# Patient Record
Sex: MALE | Race: WHITE | NOT HISPANIC OR LATINO
[De-identification: names, ages, dates, MRNs, and addresses within clinical notes are randomized per-mention and may not be internally consistent; named-entity substitution may affect disease eponyms.]

---

## 2017-10-10 ENCOUNTER — APPOINTMENT (OUTPATIENT)
Dept: INTERNAL MEDICINE | Facility: CLINIC | Age: 62
End: 2017-10-10
Payer: COMMERCIAL

## 2017-10-10 VITALS
OXYGEN SATURATION: 99 % | SYSTOLIC BLOOD PRESSURE: 130 MMHG | HEIGHT: 68 IN | HEART RATE: 61 BPM | WEIGHT: 164 LBS | BODY MASS INDEX: 24.86 KG/M2 | DIASTOLIC BLOOD PRESSURE: 80 MMHG | TEMPERATURE: 99 F

## 2017-10-10 DIAGNOSIS — M23.209 DERANGEMENT OF UNSPECIFIED MENISCUS DUE TO OLD TEAR OR INJURY, UNSPECIFIED KNEE: ICD-10-CM

## 2017-10-10 DIAGNOSIS — M75.102 UNSPECIFIED ROTATOR CUFF TEAR OR RUPTURE OF LEFT SHOULDER, NOT SPECIFIED AS TRAUMATIC: ICD-10-CM

## 2017-10-10 DIAGNOSIS — Z80.6 FAMILY HISTORY OF LEUKEMIA: ICD-10-CM

## 2017-10-10 DIAGNOSIS — Z82.49 FAMILY HISTORY OF ISCHEMIC HEART DISEASE AND OTHER DISEASES OF THE CIRCULATORY SYSTEM: ICD-10-CM

## 2017-10-10 DIAGNOSIS — Z86.69 PERSONAL HISTORY OF OTHER DISEASES OF THE NERVOUS SYSTEM AND SENSE ORGANS: ICD-10-CM

## 2017-10-10 DIAGNOSIS — Z23 ENCOUNTER FOR IMMUNIZATION: ICD-10-CM

## 2017-10-10 DIAGNOSIS — Z80.8 FAMILY HISTORY OF MALIGNANT NEOPLASM OF OTHER ORGANS OR SYSTEMS: ICD-10-CM

## 2017-10-10 PROCEDURE — 99386 PREV VISIT NEW AGE 40-64: CPT | Mod: 25

## 2017-10-10 PROCEDURE — G0008: CPT

## 2017-10-10 PROCEDURE — 90686 IIV4 VACC NO PRSV 0.5 ML IM: CPT

## 2017-10-10 PROCEDURE — 36415 COLL VENOUS BLD VENIPUNCTURE: CPT

## 2017-10-15 PROBLEM — Z82.49 FAMILY HISTORY OF CORONARY ARTERY DISEASE: Status: ACTIVE | Noted: 2017-10-15

## 2017-10-15 PROBLEM — Z80.6 FAMILY HISTORY OF LEUKEMIA: Status: ACTIVE | Noted: 2017-10-15

## 2017-10-15 PROBLEM — Z80.8 FAMILY HISTORY OF MALIGNANT MELANOMA: Status: ACTIVE | Noted: 2017-10-15

## 2017-10-15 PROBLEM — Z86.69 HISTORY OF MENIERE'S DISEASE: Status: RESOLVED | Noted: 2017-10-15 | Resolved: 2017-10-15

## 2017-10-16 ENCOUNTER — MEDICATION RENEWAL (OUTPATIENT)
Age: 62
End: 2017-10-16

## 2017-10-30 ENCOUNTER — TRANSCRIPTION ENCOUNTER (OUTPATIENT)
Age: 62
End: 2017-10-30

## 2017-11-03 LAB
ALBUMIN SERPL ELPH-MCNC: 4.4 G/DL
ALP BLD-CCNC: 42 U/L
ALT SERPL-CCNC: 20 U/L
ANION GAP SERPL CALC-SCNC: 11 MMOL/L
APPEARANCE: CLEAR
AST SERPL-CCNC: 19 U/L
BASOPHILS # BLD AUTO: 0.04 K/UL
BASOPHILS NFR BLD AUTO: 0.8 %
BILIRUB SERPL-MCNC: 0.3 MG/DL
BILIRUBIN URINE: NEGATIVE
BLOOD URINE: NEGATIVE
BUN SERPL-MCNC: 16 MG/DL
CALCIUM SERPL-MCNC: 9.9 MG/DL
CHLORIDE SERPL-SCNC: 103 MMOL/L
CHOLEST SERPL-MCNC: 273 MG/DL
CHOLEST/HDLC SERPL: 4 RATIO
CO2 SERPL-SCNC: 27 MMOL/L
COLOR: YELLOW
CREAT SERPL-MCNC: 0.78 MG/DL
EOSINOPHIL # BLD AUTO: 0.42 K/UL
EOSINOPHIL NFR BLD AUTO: 8.7 %
GLUCOSE QUALITATIVE U: NEGATIVE MG/DL
GLUCOSE SERPL-MCNC: 84 MG/DL
HBA1C MFR BLD HPLC: 5.6 %
HCT VFR BLD CALC: 40.9 %
HDLC SERPL-MCNC: 68 MG/DL
HGB BLD-MCNC: 13 G/DL
IMM GRANULOCYTES NFR BLD AUTO: 0.2 %
KETONES URINE: NEGATIVE
LDLC SERPL CALC-MCNC: 182 MG/DL
LEUKOCYTE ESTERASE URINE: NEGATIVE
LYMPHOCYTES # BLD AUTO: 1.86 K/UL
LYMPHOCYTES NFR BLD AUTO: 38.4 %
MAN DIFF?: NORMAL
MCHC RBC-ENTMCNC: 31.2 PG
MCHC RBC-ENTMCNC: 31.8 GM/DL
MCV RBC AUTO: 98.1 FL
MONOCYTES # BLD AUTO: 0.47 K/UL
MONOCYTES NFR BLD AUTO: 9.7 %
NEUTROPHILS # BLD AUTO: 2.05 K/UL
NEUTROPHILS NFR BLD AUTO: 42.2 %
NITRITE URINE: NEGATIVE
PH URINE: 6.5
PLATELET # BLD AUTO: 269 K/UL
POTASSIUM SERPL-SCNC: 4.5 MMOL/L
PROT SERPL-MCNC: 7.2 G/DL
PROTEIN URINE: NEGATIVE MG/DL
PSA SERPL-MCNC: 2.06 NG/ML
RBC # BLD: 4.17 M/UL
RBC # FLD: 15.6 %
SODIUM SERPL-SCNC: 141 MMOL/L
SPECIFIC GRAVITY URINE: 1.01
T4 FREE SERPL-MCNC: 1.2 NG/DL
TRIGL SERPL-MCNC: 115 MG/DL
TSH SERPL-ACNC: 1.95 UIU/ML
UROBILINOGEN URINE: NEGATIVE MG/DL
WBC # FLD AUTO: 4.85 K/UL

## 2018-01-02 ENCOUNTER — MEDICATION RENEWAL (OUTPATIENT)
Age: 63
End: 2018-01-02

## 2018-01-12 ENCOUNTER — INPATIENT (INPATIENT)
Facility: HOSPITAL | Age: 63
LOS: 1 days | Discharge: HOME | DRG: 964 | End: 2018-01-14
Attending: SURGERY | Admitting: SURGERY
Payer: COMMERCIAL

## 2018-01-12 ENCOUNTER — EMERGENCY (EMERGENCY)
Facility: HOSPITAL | Age: 63
LOS: 1 days | Discharge: ANOTHER HOSPITAL | End: 2018-01-12
Attending: EMERGENCY MEDICINE | Admitting: EMERGENCY MEDICINE
Payer: COMMERCIAL

## 2018-01-12 VITALS
RESPIRATION RATE: 20 BRPM | HEIGHT: 68 IN | SYSTOLIC BLOOD PRESSURE: 151 MMHG | WEIGHT: 160.06 LBS | DIASTOLIC BLOOD PRESSURE: 76 MMHG | HEART RATE: 700 BPM | OXYGEN SATURATION: 99 %

## 2018-01-12 VITALS
HEART RATE: 84 BPM | SYSTOLIC BLOOD PRESSURE: 144 MMHG | TEMPERATURE: 99 F | OXYGEN SATURATION: 97 % | RESPIRATION RATE: 16 BRPM | DIASTOLIC BLOOD PRESSURE: 88 MMHG

## 2018-01-12 VITALS
HEART RATE: 88 BPM | DIASTOLIC BLOOD PRESSURE: 78 MMHG | SYSTOLIC BLOOD PRESSURE: 148 MMHG | RESPIRATION RATE: 18 BRPM | OXYGEN SATURATION: 100 %

## 2018-01-12 DIAGNOSIS — S22.42XA MULTIPLE FRACTURES OF RIBS, LEFT SIDE, INITIAL ENCOUNTER FOR CLOSED FRACTURE: ICD-10-CM

## 2018-01-12 LAB
ALBUMIN SERPL ELPH-MCNC: 3.7 G/DL — SIGNIFICANT CHANGE UP (ref 3.3–5)
ALP SERPL-CCNC: 47 U/L — SIGNIFICANT CHANGE UP (ref 40–120)
ALT FLD-CCNC: 34 U/L — SIGNIFICANT CHANGE UP (ref 12–78)
ANION GAP SERPL CALC-SCNC: 10 MMOL/L — SIGNIFICANT CHANGE UP (ref 5–17)
AST SERPL-CCNC: 35 U/L — SIGNIFICANT CHANGE UP (ref 15–37)
BASOPHILS # BLD AUTO: 0.1 K/UL — SIGNIFICANT CHANGE UP (ref 0–0.2)
BASOPHILS NFR BLD AUTO: 1.4 % — SIGNIFICANT CHANGE UP (ref 0–2)
BILIRUB SERPL-MCNC: 0.4 MG/DL — SIGNIFICANT CHANGE UP (ref 0.2–1.2)
BLD GP AB SCN SERPL QL: NEGATIVE — SIGNIFICANT CHANGE UP
BUN SERPL-MCNC: 13 MG/DL — SIGNIFICANT CHANGE UP (ref 7–23)
CALCIUM SERPL-MCNC: 8.4 MG/DL — LOW (ref 8.5–10.1)
CHLORIDE SERPL-SCNC: 105 MMOL/L — SIGNIFICANT CHANGE UP (ref 96–108)
CO2 SERPL-SCNC: 23 MMOL/L — SIGNIFICANT CHANGE UP (ref 22–31)
CREAT SERPL-MCNC: 0.86 MG/DL — SIGNIFICANT CHANGE UP (ref 0.5–1.3)
EOSINOPHIL # BLD AUTO: 0.3 K/UL — SIGNIFICANT CHANGE UP (ref 0–0.5)
EOSINOPHIL NFR BLD AUTO: 3.6 % — SIGNIFICANT CHANGE UP (ref 0–6)
GLUCOSE SERPL-MCNC: 114 MG/DL — HIGH (ref 70–99)
HCT VFR BLD CALC: 37.2 % — LOW (ref 39–50)
HGB BLD-MCNC: 12 G/DL — LOW (ref 13–17)
LYMPHOCYTES # BLD AUTO: 2.3 K/UL — SIGNIFICANT CHANGE UP (ref 1–3.3)
LYMPHOCYTES # BLD AUTO: 29.2 % — SIGNIFICANT CHANGE UP (ref 13–44)
MCHC RBC-ENTMCNC: 31.2 PG — SIGNIFICANT CHANGE UP (ref 27–34)
MCHC RBC-ENTMCNC: 32.3 GM/DL — SIGNIFICANT CHANGE UP (ref 32–36)
MCV RBC AUTO: 96.5 FL — SIGNIFICANT CHANGE UP (ref 80–100)
MONOCYTES # BLD AUTO: 0.6 K/UL — SIGNIFICANT CHANGE UP (ref 0–0.9)
MONOCYTES NFR BLD AUTO: 7.4 % — SIGNIFICANT CHANGE UP (ref 1–9)
NEUTROPHILS # BLD AUTO: 4.6 K/UL — SIGNIFICANT CHANGE UP (ref 1.8–7.4)
NEUTROPHILS NFR BLD AUTO: 58.4 % — SIGNIFICANT CHANGE UP (ref 43–77)
PLATELET # BLD AUTO: 214 K/UL — SIGNIFICANT CHANGE UP (ref 150–400)
POTASSIUM SERPL-MCNC: 3.4 MMOL/L — LOW (ref 3.5–5.3)
POTASSIUM SERPL-SCNC: 3.4 MMOL/L — LOW (ref 3.5–5.3)
PROT SERPL-MCNC: 7.1 G/DL — SIGNIFICANT CHANGE UP (ref 6–8.3)
RBC # BLD: 3.85 M/UL — LOW (ref 4.2–5.8)
RBC # FLD: 13.3 % — SIGNIFICANT CHANGE UP (ref 10.3–14.5)
RH IG SCN BLD-IMP: POSITIVE — SIGNIFICANT CHANGE UP
SODIUM SERPL-SCNC: 138 MMOL/L — SIGNIFICANT CHANGE UP (ref 135–145)
WBC # BLD: 7.9 K/UL — SIGNIFICANT CHANGE UP (ref 3.8–10.5)
WBC # FLD AUTO: 7.9 K/UL — SIGNIFICANT CHANGE UP (ref 3.8–10.5)

## 2018-01-12 PROCEDURE — 74177 CT ABD & PELVIS W/CONTRAST: CPT | Mod: 26

## 2018-01-12 PROCEDURE — 99285 EMERGENCY DEPT VISIT HI MDM: CPT

## 2018-01-12 PROCEDURE — 70450 CT HEAD/BRAIN W/O DYE: CPT | Mod: 26,77

## 2018-01-12 PROCEDURE — 74177 CT ABD & PELVIS W/CONTRAST: CPT

## 2018-01-12 PROCEDURE — 71260 CT THORAX DX C+: CPT

## 2018-01-12 PROCEDURE — 71045 X-RAY EXAM CHEST 1 VIEW: CPT | Mod: 26

## 2018-01-12 PROCEDURE — 36415 COLL VENOUS BLD VENIPUNCTURE: CPT

## 2018-01-12 PROCEDURE — 80053 COMPREHEN METABOLIC PANEL: CPT

## 2018-01-12 PROCEDURE — 96375 TX/PRO/DX INJ NEW DRUG ADDON: CPT

## 2018-01-12 PROCEDURE — 70450 CT HEAD/BRAIN W/O DYE: CPT

## 2018-01-12 PROCEDURE — 72125 CT NECK SPINE W/O DYE: CPT | Mod: 26

## 2018-01-12 PROCEDURE — 99285 EMERGENCY DEPT VISIT HI MDM: CPT | Mod: 25

## 2018-01-12 PROCEDURE — 85027 COMPLETE CBC AUTOMATED: CPT

## 2018-01-12 PROCEDURE — 99222 1ST HOSP IP/OBS MODERATE 55: CPT

## 2018-01-12 PROCEDURE — 72125 CT NECK SPINE W/O DYE: CPT

## 2018-01-12 PROCEDURE — 73030 X-RAY EXAM OF SHOULDER: CPT | Mod: 26,LT

## 2018-01-12 PROCEDURE — 71260 CT THORAX DX C+: CPT | Mod: 26

## 2018-01-12 PROCEDURE — 96374 THER/PROPH/DIAG INJ IV PUSH: CPT | Mod: XU

## 2018-01-12 PROCEDURE — 70450 CT HEAD/BRAIN W/O DYE: CPT | Mod: 26

## 2018-01-12 RX ORDER — LIDOCAINE 4 G/100G
1 CREAM TOPICAL ONCE
Qty: 0 | Refills: 0 | Status: COMPLETED | OUTPATIENT
Start: 2018-01-12 | End: 2018-01-12

## 2018-01-12 RX ORDER — HYDROCORTISONE 20 MG
200 TABLET ORAL ONCE
Qty: 0 | Refills: 0 | Status: COMPLETED | OUTPATIENT
Start: 2018-01-12 | End: 2018-01-12

## 2018-01-12 RX ORDER — DIPHENHYDRAMINE HCL 50 MG
50 CAPSULE ORAL ONCE
Qty: 0 | Refills: 0 | Status: COMPLETED | OUTPATIENT
Start: 2018-01-12 | End: 2018-01-12

## 2018-01-12 RX ORDER — ACETAMINOPHEN 500 MG
1000 TABLET ORAL ONCE
Qty: 0 | Refills: 0 | Status: COMPLETED | OUTPATIENT
Start: 2018-01-12 | End: 2018-01-12

## 2018-01-12 RX ORDER — MORPHINE SULFATE 50 MG/1
4 CAPSULE, EXTENDED RELEASE ORAL ONCE
Qty: 0 | Refills: 0 | Status: DISCONTINUED | OUTPATIENT
Start: 2018-01-12 | End: 2018-01-12

## 2018-01-12 RX ADMIN — LIDOCAINE 1 PATCH: 4 CREAM TOPICAL at 20:38

## 2018-01-12 RX ADMIN — Medication 400 MILLIGRAM(S): at 20:38

## 2018-01-12 RX ADMIN — Medication 1000 MILLIGRAM(S): at 22:06

## 2018-01-12 RX ADMIN — Medication 50 MILLIGRAM(S): at 17:13

## 2018-01-12 RX ADMIN — MORPHINE SULFATE 4 MILLIGRAM(S): 50 CAPSULE, EXTENDED RELEASE ORAL at 19:19

## 2018-01-12 RX ADMIN — Medication 200 MILLIGRAM(S): at 17:13

## 2018-01-12 RX ADMIN — MORPHINE SULFATE 4 MILLIGRAM(S): 50 CAPSULE, EXTENDED RELEASE ORAL at 19:13

## 2018-01-12 NOTE — ED PROVIDER NOTE - OBJECTIVE STATEMENT
61 y/o M w/ hx of hyperchol presents as tx from [facility] after he fell off a roof this morning, from 18 ft height, found to have multiple L. sided rib fx, lung contusion, small L.sided pneumo, and questionable very small L. temporal focus of bleed. No LOC, not on blood thinners. Pt mentating well, moving all extrem, no HA, no back pain, reports L. sided rib pain.

## 2018-01-12 NOTE — H&P ADULT - NSHPPHYSICALEXAM_GEN_ALL_CORE
Tmax: 37.1 (01-12-18 @ 20:23)  HR: 72  BP: 120/72  RR: 18  SpO2: 97%    Gen: NAD  Head: Small abrasion to L head  Neck: No c-spine tenderness  Chest: L sided chest wall tenderness  Lungs: Non-labored breathing  Heart: S1, S2  Abdomen: Soft, ND, NTP  : No blood around meatus  Extremities: Abrasion to L forearm  Vascular: Palpable radial and DP pulses  Back: No tenderness

## 2018-01-12 NOTE — H&P ADULT - ASSESSMENT
68 M presents after a fall from 2 story building. Found to have L 1, 3-8 rib fractures, small pneumothorax, and lung contusion. There was also a questionable attenuation of the L temporal region, that a small focus of hemorrhage cannot be excluded. Will admit to Trauma Surgery (Caitlin). Patient was pulling in 2 L on incentive spirometry and had an oxygen saturation of 98% on room air.  -Pain control (Tylenol, lidocaine patch, oxycodone PRN). No Motrin given age and questionable head bleed.  -Will repeat CT head to assess questionable head bleed  -Incentive spirometry  -Repeat CXR to assess small pneumothorax seen on CT chest. No need for chest tube at this time.  -Regular diet  -Mechanical VTE prophylaxis for now  -D/w attending  SHELBY Tucker  [id number] 68 M presents after a fall from 2 story building. Found to have L 1, 3-8 rib fractures, small pneumothorax, and lung contusion. There was also a questionable attenuation of the L temporal region, that a small focus of hemorrhage cannot be excluded. Will admit to Trauma Surgery (Caitlin). Patient was pulling in 2 L on incentive spirometry and had an oxygen saturation of 98% on room air.  -Pain control (Tylenol, lidocaine patch, oxycodone PRN). No Motrin given age and questionable head bleed.  -Will repeat CT head to assess questionable head bleed  -Incentive spirometry  -Repeat CXR to assess small pneumothorax seen on CT chest. No need for chest tube at this time.  -Regular diet  -Mechanical VTE prophylaxis for now  -Will need to obtain does of home medications  -D/w attending  SHELBY Tucker  8299 68 M presents after a fall from 2 story building. Found to have L 1, 3-8 rib fractures, small pneumothorax, and lung contusion. There was also a questionable attenuation of the L temporal region, that a small focus of hemorrhage cannot be excluded. Will admit to Trauma Surgery (Caitlin). Patient was pulling in 2 L on incentive spirometry and had an oxygen saturation of 98% on room air.  -Pain control (Tylenol, lidocaine patch, oxycodone PRN). No Motrin given age and questionable head bleed.  -Will repeat CT head to assess questionable head bleed  -Incentive spirometry  -Obtain follow up CXR to assess small pneumothorax seen on CT chest. No need for chest tube at this time.  -Regular diet  -Mechanical VTE prophylaxis for now  -Will need to obtain does of home medications  -D/w attending  SHELBY Tucker  6707 62 M presents after a fall from 2 story building. Found to have L 1, 3-8 rib fractures, small pneumothorax, and lung contusion. There was also a questionable attenuation of the L temporal region, that a small focus of hemorrhage cannot be excluded. Will admit to Trauma Surgery (Caitlin). Patient was pulling in 2 L on incentive spirometry and had an oxygen saturation of 98% on room air.  -Pain control (Tylenol, lidocaine patch, oxycodone PRN). No Motrin given age and questionable head bleed.  -Will repeat CT head to assess questionable head bleed  -Incentive spirometry  -Obtain follow up CXR to assess small pneumothorax seen on CT chest. No need for chest tube at this time.  -Regular diet  -Mechanical VTE prophylaxis for now  -Will need to obtain does of home medications  -D/w attending  SHELBY Tucker  [id number]

## 2018-01-12 NOTE — ED PROVIDER NOTE - OBJECTIVE STATEMENT
63 yo male hx of HLD, gastritis s/p mechanical fall from roof landed on daugherty of car 18 feet from roof.  No LOC, no neck pain, walked into ED c/o left sided rib pain and pain with taking a deep breath.  No nausea/vomiting.  On ASA 81mg.  Has hx of allergy to shellfish/iodine, but only hives, no anaphylaxis.  No other complaints.  Tetanus up to date (1 year ago at [facility]).

## 2018-01-12 NOTE — ED ADULT NURSE NOTE - ED STAT RN HANDOFF DETAILS 2
Report given to Transfer center MAGNO patel for continuum of care. @1916 pt left with EMS, pain controlled, pt aaox3 in no acute distress.

## 2018-01-12 NOTE — H&P ADULT - NSHPLABSRESULTS_GEN_ALL_CORE
01-12-18    WBC: 7.9  Hgb: 12.0  Hct: 37.2  Plt: 214    Na: 138  K: 3.4  Cl: 105  HCO3: 23  BUN: 13  Cr: 0.86  Glu: 114    Ca: 8.4    Protein: 7.1  Albumin: 3.7  Total bilirubin: 0.4  AST: 35  ALT: 34    CT head: Small focal area of increased attenuation left temporal region as discussed, may represent calcification, cannot exclude small focus of hemorrhage.  CT c-spine: Multilevel degenerative disc disease/cervical spondylosis. No acute cervical spine fracture demonstrated.  CT chest: There is a nondisplaced fracture the medial aspect of the left first rib. There is a minimally displaced fracture involving the anterolateral left third rib. There are segmental fractures involving the left fourth, fifth, sixth, seventh and eighth ribs, with fractures medially in the adjacent to the costovertebral margins, and more anteriorly laterally. There is a small anterior and left apical pneumothorax. No cardiomediastinal shift is noted. There is a small left-sided pleural effusion or hemothorax. Patchy left lower lobe consolidation may represent atelectasis or lung contusion.  CT abdomen/pelvis: No acute abdominal visceral organ injury noted.    L shoulder x-ray: Preliminary read shows no fracture

## 2018-01-12 NOTE — H&P ADULT - HISTORY OF PRESENT ILLNESS
62 M presents after a fall. Patient was on the roof of a 2 story house, cleaning the drains. He slipped and landed on his car below. Patient struck his L side, and did not lose consciousness. Patient drove himself to [place]. Was then transferred to [facility] for further management. In the ED, primary survey was intact. GCS was 15. Currently, patient complains of L sided chest wall pain. Denies shortness of breath.    Of note, patient's L forearm was scraped during the CT scan at [place].

## 2018-01-12 NOTE — ED ADULT NURSE NOTE - OBJECTIVE STATEMENT
62 y m came to the ed by ems from [facility] for a trauma evaluation. patient states around 4pm today he fell off a roof about 20ft and landed on a car. denies loc. has an abrasion on the left side of his head. c/o left sided rib pain. ems reports a possible brain bleed. reports multiple left rib fractures. patient came to the ed with a 20g PIV in right ac. has wound wrapped on left elbow which ems and patient reports was caused by the [facility] staff. patient is a/ox3. perrl. able to move all extremities. skin is warm and dry. abdomen is soft and nontender.

## 2018-01-12 NOTE — ED PROVIDER NOTE - CARE PLAN
Principal Discharge DX:	Fall, initial encounter Principal Discharge DX:	Multiple rib fractures involving four or more ribs  Secondary Diagnosis:	Head injury, acute, initial encounter Principal Discharge DX:	Multiple rib fractures involving four or more ribs  Secondary Diagnosis:	Head injury, acute, initial encounter  Secondary Diagnosis:	Pneumothorax on left

## 2018-01-12 NOTE — H&P ADULT - ATTENDING COMMENTS
Patient seen and examined and agree with above.   The patient ws transferred from [facility] after a fall from 18 feet roof.  GCS 15 and hemodynamically normal.  Injuries include:  1) Small focal left temporal focus- it is questionable TBI and repeat head demonstrates stable 4 mm hyperdense focus. Plan for neurosurgical consultation. The patient is currently neurologically intact.  2) multiple rib fractrues - nondisplaced fracture of medial 1st rib, min displaced fx of anteriorlateral left 3rd rib and left 4-8th segmental fracture as well as left apical pneumothorax  -repeat CXR does not appear to demonstrated pneumothorax. He is able to take 2 liters on incentive spirometer and 98% on room air. Patient was placed on supplemental oxygen due to small pneumothorax.  -aggressive pulm toileting and continue incentive spirometer  -multimodal pain control    Patient is to have regular diet and ambulate with PT.  I have discussed his care plan with him and his family

## 2018-01-12 NOTE — ED ADULT NURSE REASSESSMENT NOTE - NS ED NURSE REASSESS COMMENT FT1
patient is resting in the room waiting for repeat head ct results and dispo. denies any complaints. patient was educated and has been using the incentive spirometer. family is at the bedside. vss/nad. will continue to monitor.

## 2018-01-12 NOTE — ED PROVIDER NOTE - PHYSICAL EXAMINATION
+left parietal scalp abrasion, no laceration, +ttp left lateral rib, no crepitus, no ecchymosis, no deformity

## 2018-01-12 NOTE — ED PROVIDER NOTE - MEDICAL DECISION MAKING DETAILS
will Pan CT scan based on mechanism of fall, patient otherwise stable, will pretreat with benadryl and steroids prior to CT

## 2018-01-12 NOTE — ED PROVIDER NOTE - PHYSICAL EXAMINATION
Gen: NAD  Eyes:  sclerae white, no icterus  ENT: Moist mucous membranes. No exudates  Neck: supple, no LAD, mass or goiter, trachea midline  CV: RRR. Audible S1 and S2. No murmurs, rubs, gallops, S3, nor S4  Pulm: Clear to auscultation bilaterally. No wheezes, rales, or rhonchi  Abd: BS+, nondistended, No tenderness to palpation  Musculoskeletal:  No midline spinal TTP, + L. sided rib tenderness rib 4-8  Skin: no lesions or scars noted  Psych: mood good, affect full range and congruent with mood.  Neurologic: AAOx3

## 2018-01-12 NOTE — ED PROVIDER NOTE - CARE PLAN
Principal Discharge DX:	Closed fracture of multiple ribs of left side, initial encounter  Secondary Diagnosis:	Trauma  Secondary Diagnosis:	Brain lesion

## 2018-01-12 NOTE — ED PROVIDER NOTE - PROGRESS NOTE DETAILS
discussed case with Dr. Montoya? trauma surgeon, accepting to [place] for further evaluation, Dr. Valdes  aware will accept to [place] ED

## 2018-01-12 NOTE — ED ADULT NURSE NOTE - OBJECTIVE STATEMENT
Pt has a fall from 7 ft onto a car/ c/o left chest pain/ denies LOC/ no obvious deformities noted/ NAD noted

## 2018-01-12 NOTE — ED PROVIDER NOTE - ATTENDING CONTRIBUTION TO CARE
attending Lucio: 62yM h/o HLD transferred from [facility]SH after fall off roof at 4pm today, reports slipping off roof, falling approx 18 feet onto daugherty of car. Denies head trauma or LOC. Drove himself to hospital. No AC. CTs at OSH showed multiple L sided rib fx, small L PTX, possible lung contusion, and questionable very small L temporal lobe bleed. On exam, neuro intact, equal breath sounds bilaterally, on supplemental O2. Will administer pain control, repeat head CT at 4 hour dejah, trauma consult and admission.

## 2018-01-13 LAB
ANION GAP SERPL CALC-SCNC: 12 MMOL/L — SIGNIFICANT CHANGE UP (ref 5–17)
BUN SERPL-MCNC: 11 MG/DL — SIGNIFICANT CHANGE UP (ref 7–23)
CALCIUM SERPL-MCNC: 9 MG/DL — SIGNIFICANT CHANGE UP (ref 8.4–10.5)
CHLORIDE SERPL-SCNC: 102 MMOL/L — SIGNIFICANT CHANGE UP (ref 96–108)
CO2 SERPL-SCNC: 24 MMOL/L — SIGNIFICANT CHANGE UP (ref 22–31)
CREAT SERPL-MCNC: 0.75 MG/DL — SIGNIFICANT CHANGE UP (ref 0.5–1.3)
GLUCOSE SERPL-MCNC: 115 MG/DL — HIGH (ref 70–99)
HCT VFR BLD CALC: 34.2 % — LOW (ref 39–50)
HGB BLD-MCNC: 11.2 G/DL — LOW (ref 13–17)
MCHC RBC-ENTMCNC: 31.1 PG — SIGNIFICANT CHANGE UP (ref 27–34)
MCHC RBC-ENTMCNC: 32.7 GM/DL — SIGNIFICANT CHANGE UP (ref 32–36)
MCV RBC AUTO: 95 FL — SIGNIFICANT CHANGE UP (ref 80–100)
PLATELET # BLD AUTO: 198 K/UL — SIGNIFICANT CHANGE UP (ref 150–400)
POTASSIUM SERPL-MCNC: 4.1 MMOL/L — SIGNIFICANT CHANGE UP (ref 3.5–5.3)
POTASSIUM SERPL-SCNC: 4.1 MMOL/L — SIGNIFICANT CHANGE UP (ref 3.5–5.3)
RBC # BLD: 3.6 M/UL — LOW (ref 4.2–5.8)
RBC # FLD: 14.5 % — SIGNIFICANT CHANGE UP (ref 10.3–14.5)
SODIUM SERPL-SCNC: 138 MMOL/L — SIGNIFICANT CHANGE UP (ref 135–145)
WBC # BLD: 5.09 K/UL — SIGNIFICANT CHANGE UP (ref 3.8–10.5)
WBC # FLD AUTO: 5.09 K/UL — SIGNIFICANT CHANGE UP (ref 3.8–10.5)

## 2018-01-13 PROCEDURE — 71045 X-RAY EXAM CHEST 1 VIEW: CPT | Mod: 26

## 2018-01-13 RX ORDER — PANTOPRAZOLE SODIUM 20 MG/1
40 TABLET, DELAYED RELEASE ORAL DAILY
Qty: 0 | Refills: 0 | Status: DISCONTINUED | OUTPATIENT
Start: 2018-01-13 | End: 2018-01-14

## 2018-01-13 RX ORDER — LIDOCAINE 4 G/100G
1 CREAM TOPICAL DAILY
Qty: 0 | Refills: 0 | Status: DISCONTINUED | OUTPATIENT
Start: 2018-01-13 | End: 2018-01-14

## 2018-01-13 RX ORDER — OXYCODONE HYDROCHLORIDE 5 MG/1
10 TABLET ORAL EVERY 4 HOURS
Qty: 0 | Refills: 0 | Status: DISCONTINUED | OUTPATIENT
Start: 2018-01-13 | End: 2018-01-14

## 2018-01-13 RX ORDER — OXYCODONE HYDROCHLORIDE 5 MG/1
5 TABLET ORAL EVERY 4 HOURS
Qty: 0 | Refills: 0 | Status: DISCONTINUED | OUTPATIENT
Start: 2018-01-13 | End: 2018-01-14

## 2018-01-13 RX ORDER — ACETAMINOPHEN 500 MG
650 TABLET ORAL EVERY 6 HOURS
Qty: 0 | Refills: 0 | Status: DISCONTINUED | OUTPATIENT
Start: 2018-01-13 | End: 2018-01-14

## 2018-01-13 RX ADMIN — Medication 650 MILLIGRAM(S): at 08:00

## 2018-01-13 RX ADMIN — Medication 650 MILLIGRAM(S): at 23:23

## 2018-01-13 RX ADMIN — OXYCODONE HYDROCHLORIDE 10 MILLIGRAM(S): 5 TABLET ORAL at 18:37

## 2018-01-13 RX ADMIN — OXYCODONE HYDROCHLORIDE 10 MILLIGRAM(S): 5 TABLET ORAL at 23:22

## 2018-01-13 RX ADMIN — Medication 650 MILLIGRAM(S): at 23:22

## 2018-01-13 RX ADMIN — OXYCODONE HYDROCHLORIDE 5 MILLIGRAM(S): 5 TABLET ORAL at 00:47

## 2018-01-13 RX ADMIN — Medication 650 MILLIGRAM(S): at 12:30

## 2018-01-13 RX ADMIN — OXYCODONE HYDROCHLORIDE 10 MILLIGRAM(S): 5 TABLET ORAL at 18:19

## 2018-01-13 RX ADMIN — OXYCODONE HYDROCHLORIDE 10 MILLIGRAM(S): 5 TABLET ORAL at 12:30

## 2018-01-13 RX ADMIN — OXYCODONE HYDROCHLORIDE 5 MILLIGRAM(S): 5 TABLET ORAL at 06:04

## 2018-01-13 RX ADMIN — PANTOPRAZOLE SODIUM 40 MILLIGRAM(S): 20 TABLET, DELAYED RELEASE ORAL at 23:22

## 2018-01-13 RX ADMIN — OXYCODONE HYDROCHLORIDE 10 MILLIGRAM(S): 5 TABLET ORAL at 12:23

## 2018-01-13 RX ADMIN — Medication 650 MILLIGRAM(S): at 18:37

## 2018-01-13 RX ADMIN — Medication 650 MILLIGRAM(S): at 18:19

## 2018-01-13 RX ADMIN — OXYCODONE HYDROCHLORIDE 10 MILLIGRAM(S): 5 TABLET ORAL at 23:52

## 2018-01-13 RX ADMIN — Medication 650 MILLIGRAM(S): at 06:04

## 2018-01-13 RX ADMIN — Medication 650 MILLIGRAM(S): at 12:06

## 2018-01-13 RX ADMIN — OXYCODONE HYDROCHLORIDE 5 MILLIGRAM(S): 5 TABLET ORAL at 08:00

## 2018-01-13 RX ADMIN — LIDOCAINE 1 PATCH: 4 CREAM TOPICAL at 12:04

## 2018-01-13 RX ADMIN — LIDOCAINE 1 PATCH: 4 CREAM TOPICAL at 12:06

## 2018-01-14 ENCOUNTER — TRANSCRIPTION ENCOUNTER (OUTPATIENT)
Age: 63
End: 2018-01-14

## 2018-01-14 VITALS — DIASTOLIC BLOOD PRESSURE: 77 MMHG | OXYGEN SATURATION: 97 % | HEART RATE: 65 BPM | SYSTOLIC BLOOD PRESSURE: 143 MMHG

## 2018-01-14 LAB
ANION GAP SERPL CALC-SCNC: 12 MMOL/L — SIGNIFICANT CHANGE UP (ref 5–17)
BUN SERPL-MCNC: 10 MG/DL — SIGNIFICANT CHANGE UP (ref 7–23)
CALCIUM SERPL-MCNC: 9.4 MG/DL — SIGNIFICANT CHANGE UP (ref 8.4–10.5)
CHLORIDE SERPL-SCNC: 101 MMOL/L — SIGNIFICANT CHANGE UP (ref 96–108)
CO2 SERPL-SCNC: 25 MMOL/L — SIGNIFICANT CHANGE UP (ref 22–31)
CREAT SERPL-MCNC: 0.74 MG/DL — SIGNIFICANT CHANGE UP (ref 0.5–1.3)
GLUCOSE SERPL-MCNC: 110 MG/DL — HIGH (ref 70–99)
HCT VFR BLD CALC: 34.3 % — LOW (ref 39–50)
HGB BLD-MCNC: 11.2 G/DL — LOW (ref 13–17)
MAGNESIUM SERPL-MCNC: 2 MG/DL — SIGNIFICANT CHANGE UP (ref 1.6–2.6)
MCHC RBC-ENTMCNC: 31.8 PG — SIGNIFICANT CHANGE UP (ref 27–34)
MCHC RBC-ENTMCNC: 32.7 GM/DL — SIGNIFICANT CHANGE UP (ref 32–36)
MCV RBC AUTO: 97.4 FL — SIGNIFICANT CHANGE UP (ref 80–100)
PHOSPHATE SERPL-MCNC: 3 MG/DL — SIGNIFICANT CHANGE UP (ref 2.5–4.5)
PLATELET # BLD AUTO: 180 K/UL — SIGNIFICANT CHANGE UP (ref 150–400)
POTASSIUM SERPL-MCNC: 3.9 MMOL/L — SIGNIFICANT CHANGE UP (ref 3.5–5.3)
POTASSIUM SERPL-SCNC: 3.9 MMOL/L — SIGNIFICANT CHANGE UP (ref 3.5–5.3)
RBC # BLD: 3.52 M/UL — LOW (ref 4.2–5.8)
RBC # FLD: 15.2 % — HIGH (ref 10.3–14.5)
SODIUM SERPL-SCNC: 138 MMOL/L — SIGNIFICANT CHANGE UP (ref 135–145)
WBC # BLD: 4.74 K/UL — SIGNIFICANT CHANGE UP (ref 3.8–10.5)
WBC # FLD AUTO: 4.74 K/UL — SIGNIFICANT CHANGE UP (ref 3.8–10.5)

## 2018-01-14 PROCEDURE — 96374 THER/PROPH/DIAG INJ IV PUSH: CPT

## 2018-01-14 PROCEDURE — 73080 X-RAY EXAM OF ELBOW: CPT | Mod: 26,LT

## 2018-01-14 PROCEDURE — 99285 EMERGENCY DEPT VISIT HI MDM: CPT | Mod: 25

## 2018-01-14 PROCEDURE — 83735 ASSAY OF MAGNESIUM: CPT

## 2018-01-14 PROCEDURE — 99253 IP/OBS CNSLTJ NEW/EST LOW 45: CPT

## 2018-01-14 PROCEDURE — 73080 X-RAY EXAM OF ELBOW: CPT

## 2018-01-14 PROCEDURE — 71045 X-RAY EXAM CHEST 1 VIEW: CPT

## 2018-01-14 PROCEDURE — 80048 BASIC METABOLIC PNL TOTAL CA: CPT

## 2018-01-14 PROCEDURE — 86901 BLOOD TYPING SEROLOGIC RH(D): CPT

## 2018-01-14 PROCEDURE — 86900 BLOOD TYPING SEROLOGIC ABO: CPT

## 2018-01-14 PROCEDURE — 84100 ASSAY OF PHOSPHORUS: CPT

## 2018-01-14 PROCEDURE — 85027 COMPLETE CBC AUTOMATED: CPT

## 2018-01-14 PROCEDURE — 70450 CT HEAD/BRAIN W/O DYE: CPT

## 2018-01-14 PROCEDURE — 86850 RBC ANTIBODY SCREEN: CPT

## 2018-01-14 PROCEDURE — 73030 X-RAY EXAM OF SHOULDER: CPT

## 2018-01-14 PROCEDURE — 97161 PT EVAL LOW COMPLEX 20 MIN: CPT

## 2018-01-14 RX ORDER — SENNA PLUS 8.6 MG/1
2 TABLET ORAL AT BEDTIME
Qty: 0 | Refills: 0 | Status: DISCONTINUED | OUTPATIENT
Start: 2018-01-14 | End: 2018-01-14

## 2018-01-14 RX ORDER — DOCUSATE SODIUM 100 MG
100 CAPSULE ORAL THREE TIMES A DAY
Qty: 0 | Refills: 0 | Status: DISCONTINUED | OUTPATIENT
Start: 2018-01-14 | End: 2018-01-14

## 2018-01-14 RX ORDER — ENOXAPARIN SODIUM 100 MG/ML
40 INJECTION SUBCUTANEOUS DAILY
Qty: 0 | Refills: 0 | Status: DISCONTINUED | OUTPATIENT
Start: 2018-01-14 | End: 2018-01-14

## 2018-01-14 RX ORDER — TRAMADOL HYDROCHLORIDE 50 MG/1
25 TABLET ORAL EVERY 4 HOURS
Qty: 0 | Refills: 0 | Status: DISCONTINUED | OUTPATIENT
Start: 2018-01-14 | End: 2018-01-14

## 2018-01-14 RX ORDER — OXYCODONE HYDROCHLORIDE 5 MG/1
1 TABLET ORAL
Qty: 18 | Refills: 0 | OUTPATIENT
Start: 2018-01-14 | End: 2018-01-16

## 2018-01-14 RX ADMIN — Medication 100 MILLIGRAM(S): at 12:25

## 2018-01-14 RX ADMIN — OXYCODONE HYDROCHLORIDE 10 MILLIGRAM(S): 5 TABLET ORAL at 12:36

## 2018-01-14 RX ADMIN — Medication 650 MILLIGRAM(S): at 12:25

## 2018-01-14 RX ADMIN — Medication 650 MILLIGRAM(S): at 12:36

## 2018-01-14 RX ADMIN — OXYCODONE HYDROCHLORIDE 10 MILLIGRAM(S): 5 TABLET ORAL at 17:44

## 2018-01-14 RX ADMIN — LIDOCAINE 1 PATCH: 4 CREAM TOPICAL at 12:24

## 2018-01-14 RX ADMIN — Medication 650 MILLIGRAM(S): at 17:47

## 2018-01-14 RX ADMIN — OXYCODONE HYDROCHLORIDE 10 MILLIGRAM(S): 5 TABLET ORAL at 05:52

## 2018-01-14 RX ADMIN — Medication 650 MILLIGRAM(S): at 05:48

## 2018-01-14 RX ADMIN — Medication 650 MILLIGRAM(S): at 17:44

## 2018-01-14 RX ADMIN — ENOXAPARIN SODIUM 40 MILLIGRAM(S): 100 INJECTION SUBCUTANEOUS at 12:25

## 2018-01-14 RX ADMIN — OXYCODONE HYDROCHLORIDE 10 MILLIGRAM(S): 5 TABLET ORAL at 06:34

## 2018-01-14 RX ADMIN — OXYCODONE HYDROCHLORIDE 10 MILLIGRAM(S): 5 TABLET ORAL at 12:25

## 2018-01-14 RX ADMIN — OXYCODONE HYDROCHLORIDE 10 MILLIGRAM(S): 5 TABLET ORAL at 18:03

## 2018-01-14 RX ADMIN — PANTOPRAZOLE SODIUM 40 MILLIGRAM(S): 20 TABLET, DELAYED RELEASE ORAL at 12:25

## 2018-01-14 RX ADMIN — Medication 650 MILLIGRAM(S): at 06:33

## 2018-01-14 RX ADMIN — LIDOCAINE 1 PATCH: 4 CREAM TOPICAL at 00:00

## 2018-01-14 NOTE — DISCHARGE NOTE ADULT - PATIENT PORTAL LINK FT
“You can access the Follow[facility] Patient Portal, offered by [facility], by registering with the following website: [email or web address]”

## 2018-01-14 NOTE — PHYSICAL THERAPY INITIAL EVALUATION ADULT - PERTINENT HX OF CURRENT PROBLEM, REHAB EVAL
63y/o M presents after a fall. Pt was on the roof of a 2 story house, cleaning the drains. He slipped and landed on his car below. Pt struck his L side, and did not lose consciousness. Pt drove himself to [place]. Was then transferred to [facility] for further management. In the ED, primary survey was intact. GCS was 15. Currently, pt complains of L sided chest wall pain. Denies shortness of breath. Of note, pt's L forearm was scraped during the CT scan at [place] (cont below)

## 2018-01-14 NOTE — PHYSICAL THERAPY INITIAL EVALUATION ADULT - ADDITIONAL COMMENTS
Pt lives in a private home with no steps to enter and second floor bedroom ~15 steps up. Pt lives with wife and daughter who he states can assist upon d/c home. Prior to admission pt was independent with all functional mobility.

## 2018-01-14 NOTE — DISCHARGE NOTE ADULT - CARE PLAN
Principal Discharge DX:	Closed fracture of multiple ribs of left side, initial encounter  Goal:	Return to normal function and activity  Instructions for follow-up, activity and diet:	ACTIVITY: No heavy lifting or straining. Otherwise, you may return to your usual level of physical activity. If you are taking narcotic pain medication (such as Percocet) DO NOT drive a car, operate machinery or make important decisions.  DIET: Return to your usual diet.  NOTIFY YOUR SURGEON IF: You have any bleeding that does not stop, any pus draining from your wound(s), any fever (over 100.4 F) or chills, persistent nausea/vomiting, persistent diarrhea, or if your pain is not controlled on your discharge pain medications.  FOLLOW-UP: Please follow up with your primary care physician in one week regarding your hospitalization. Please follow-up with your surgeon, within 7 days following discharge- please call to schedule an appointment.  Secondary Diagnosis:	Brain lesion

## 2018-01-14 NOTE — DISCHARGE NOTE ADULT - MEDICATION SUMMARY - MEDICATIONS TO TAKE
I will START or STAY ON the medications listed below when I get home from the hospital:    oxyCODONE 5 mg oral tablet  -- 1 tab(s) by mouth every 4 hours, As needed, Mild Pain (1 - 6) MDD:6  -- Indication: For pain    Crestor  -- Indication: For Cholesterol    omeprazole  -- Indication: For heartburn

## 2018-01-14 NOTE — PROGRESS NOTE ADULT - ASSESSMENT
62 M presents after a fall from 2 story building. Found to have L 1, 3-8 rib fractures, small pneumothorax, and lung contusion. There was also a questionable attenuation of the L temporal region, that a small focus of hemorrhage cannot be excluded that was stable on repeat CTH.   -Pain control (Tylenol, lidocaine patch, oxycodone PRN). No Motrin given age and questionable head bleed.  - Repeat CTH stable  - Spoke with Neurosurgery - they reviewed the images and recommended no intervention and no blood thinners, and no need for outpatient follow up.   -Incentive spirometry  - Repeat CXR negative.   -Regular diet  -Mechanical VTE prophylaxis
2 M presents after a fall from 2 story building. Found to have L 1, 3-8 rib fractures, small pneumothorax, and lung contusion. There was also a questionable attenuation of the L temporal region, that a small focus of hemorrhage cannot be excluded. Patient was pulling in 2 L on incentive spirometry and had an oxygen saturation of 98% on room air.  -Pain control (Tylenol, lidocaine patch, oxycodone PRN). No Motrin given age and questionable head bleed.  - Repeat CTH stable  - Spoke with Neurosurgery - they reviewed the images and recommended no intervention and no blood thinners, and no need for outpatient follow up.   -Incentive spirometry  -Obtain follow up CXR to assess small pneumothorax seen on CT chest. No need for chest tube at this time.  -Regular diet  -Mechanical VTE prophylaxis for now

## 2018-01-14 NOTE — CONSULT NOTE ADULT - ASSESSMENT
62M s/p fall with multiple rib fractures and found to have left temporal hyperdensity on CT head stable on repeat CT. Hyperdense focus may represent tiny hemorrhage vs. calcified lesion, less likely artifactual given presence on repeat imaging.     -No acute neurosurgical intervention indicated at this time  -Neurochecks q4h  -Pain control  -No keppra for now  -No further imaging as inpatient necessary unless neurologic exam change  -Patient may follow up as outpatient with Dr. Jo after discharge  -C/w care per primary team

## 2018-01-14 NOTE — DISCHARGE NOTE ADULT - HOSPITAL COURSE
HPI: 62 M presents after a fall. Patient was on the roof of a 2 story house, cleaning the drains. He slipped and landed on his car below. Patient struck his L side, and did not lose consciousness. Patient drove himself to [place]. Was then transferred to [facility] for further management. In the ED, primary survey was intact. GCS was 15. Currently, patient complains of L sided chest wall pain. Denies shortness of breath.    Patient admitted to surgery for observation. repeat CTH stable. Pt.. afebrile with stable vital signs and labs.   Patient is ambulating, tolerating a diet, and stable for discharge with outpatient follow up.

## 2018-01-14 NOTE — PHYSICAL THERAPY INITIAL EVALUATION ADULT - PRECAUTIONS/LIMITATIONS, REHAB EVAL
fall precautions/CTHead:Stable 4 mm hyperdense focus in the left temporal lobe. No cerebral edema, mass effect, or midline shift. CXR:No pneumothorax. Trace left pleural effusion and/or left basilar atelectasis, new. XRayLShoulder:Moderate left glenohumeral arthrosis. No acute fracture.

## 2018-01-14 NOTE — DISCHARGE NOTE ADULT - CARE PROVIDER_API CALL
Nilda Jo; PhD), Neurological Surgery  [address]  [place] [address]  Phone: [phone]  Fax: [phone]    Thompson Carrillo (MD), Surgery; Surgical Critical Care  [address] [place] [address]  Phone: [phone]  Fax: [phone]

## 2018-01-14 NOTE — CONSULT NOTE ADULT - ASSESSMENT
A/P: 62M with left elbow laceration  Pain control  WBAT LUE  DVT ppx  imaging reviewed  d/w pt and family no need for acute orthopedic surgical intervention  ortho stable for discharge  follow up with Dr. Dennis 1-2 weeks after discharge if elbow pain present  d/w Dr. Dennis, agrees with above plan.

## 2018-01-14 NOTE — PROGRESS NOTE ADULT - SUBJECTIVE AND OBJECTIVE BOX
ATP Team Surgery Progress Note    SUBJECTIVE: Pt seen and examined at bedside. No overnight events. Patient comfortable and in no-apparent distress. No nausea, vomiting, or diarrhea. Tolerating diet, ambulating.  Pain controlled       Vital Signs Last 24 Hrs  T(C): 36.6 (14 Jan 2018 06:13), Max: 36.9 (13 Jan 2018 18:03)  T(F): 97.9 (14 Jan 2018 06:13), Max: 98.4 (13 Jan 2018 18:03)  HR: 60 (14 Jan 2018 06:13) (60 - 73)  BP: 130/79 (14 Jan 2018 06:13) (118/73 - 134/75)  BP(mean): --  RR: 18 (14 Jan 2018 06:13) (16 - 18)  SpO2: 96% (14 Jan 2018 06:13) (95% - 98%)    Physical Exam:  General Appearance: Appears well, NAD  Abdomen: Soft, nontense      LABS:                        11.2   5.09  )-----------( 198      ( 13 Jan 2018 08:37 )             34.2     01-13    138  |  102  |  11  ----------------------------<  115<H>  4.1   |  24  |  0.75    Ca    9.0      13 Jan 2018 08:46    TPro  7.1  /  Alb  3.7  /  TBili  0.4  /  DBili  x   /  AST  35  /  ALT  34  /  AlkPhos  47  01-12          INs and OUTs:    01-12-18 @ 07:01 - 01-13-18 @ 07:00  --------------------------------------------------------  IN: 200 mL / OUT: 0 mL / NET: 200 mL    01-13-18 @ 07:01  - 01-14-18 @ 06:34  --------------------------------------------------------  IN: 1160 mL / OUT: 1200 mL / NET: -40 mL
ATP Team Surgery Progress Note    SUBJECTIVE: Pt seen and examined at bedside. No overnight events. Patient comfortable and in no-apparent distress. No nausea, vomiting, or diarrhea.  Pain controlled. Patient ambulating around the floor.       Vital Signs Last 24 Hrs  T(C): 36.4 (13 Jan 2018 13:44), Max: 37.1 (12 Jan 2018 20:23)  T(F): 97.6 (13 Jan 2018 13:44), Max: 98.8 (12 Jan 2018 20:23)  HR: 70 (13 Jan 2018 13:44) (59 - 700)  BP: 123/73 (13 Jan 2018 13:44) (112/64 - 151/76)  BP(mean): --  RR: 16 (13 Jan 2018 13:44) (16 - 20)  SpO2: 97% (13 Jan 2018 13:44) (97% - 100%)    Physical Exam:  General Appearance: Appears well, NAD  Abdomen: Soft, nontense, NTND,       LABS:                        11.2   5.09  )-----------( 198      ( 13 Jan 2018 08:37 )             34.2     01-13    138  |  102  |  11  ----------------------------<  115<H>  4.1   |  24  |  0.75    Ca    9.0      13 Jan 2018 08:46    TPro  7.1  /  Alb  3.7  /  TBili  0.4  /  DBili  x   /  AST  35  /  ALT  34  /  AlkPhos  47  01-12          INs and OUTs:    01-12-18 @ 07:01 - 01-13-18 @ 07:00  --------------------------------------------------------  IN: 200 mL / OUT: 0 mL / NET: 200 mL    01-13-18 @ 07:01  -  01-13-18 @ 14:17  --------------------------------------------------------  IN: 800 mL / OUT: 600 mL / NET: 200 mL

## 2018-01-14 NOTE — DISCHARGE NOTE ADULT - PLAN OF CARE
Return to normal function and activity ACTIVITY: No heavy lifting or straining. Otherwise, you may return to your usual level of physical activity. If you are taking narcotic pain medication (such as Percocet) DO NOT drive a car, operate machinery or make important decisions.  DIET: Return to your usual diet.  NOTIFY YOUR SURGEON IF: You have any bleeding that does not stop, any pus draining from your wound(s), any fever (over 100.4 F) or chills, persistent nausea/vomiting, persistent diarrhea, or if your pain is not controlled on your discharge pain medications.  FOLLOW-UP: Please follow up with your primary care physician in one week regarding your hospitalization. Please follow-up with your surgeon, within 7 days following discharge- please call to schedule an appointment.

## 2018-01-14 NOTE — CONSULT NOTE ADULT - SUBJECTIVE AND OBJECTIVE BOX
62M presented to [facility] ED 2 days ago s/p fall from 18 ft roof with multiple rib fractures and pulmonary contusion. He was transferred from [facility] where he was initially evaluated. During CT Scanning at [place], his arm was accidentally bumped and he sustained a laceration to the left lateral elbow. He denies any pain in the elbow and it was only noticed incidentally prior to his discharge today. Ortho consulted to rule out bony or joint involvement. PT denies any numbness, tingling, paresthesias Denies fever/chills/v/d, or feeling of warmth in elbow. Denies any other orthopedic injuries at this time.     PMH: BENNY, GERD  PSH:  Denies  Meds: See Med Rec  All: NKDA  Social: Denies  Imaging: XR L elbow - calcification over lateral aspect of elbow, no discernable fracture      VS: AVSS    Gen: NAD    LUE: no gross deformity, 1 cm laceration on lateral elbow over radial head, does not probe down to bone, no joint fluid expressed. no erythema/ecchymosis/edema, no ttp elbow/wrist/shoudler/hand with full painless ROM, AIN/PIN/R/M/U intact, SILT C5-T1, +RP, CR Brisk, compartments soft    2' Survey, no ttp bony prominences, pain over ribs, SILT, palpable pulses, full/painless ROM, compartments soft

## 2018-01-14 NOTE — DISCHARGE NOTE ADULT - CARE PROVIDERS DIRECT ADDRESSES
[email or web address]

## 2018-01-14 NOTE — CONSULT NOTE ADULT - SUBJECTIVE AND OBJECTIVE BOX
HPI:  62 M presents after a fall down two flight of stairs. Did not lose consciousness. Was then transferred to [facility] for further management. Initial GCS was 15. Patient complains difficulty breathing and left sided chest pain and found to have lung contusion a/w multiple rib fractures. CT head shows left temporal hyperdensity, possible hemorrhage. Patient denies headache, nausea, vomiting, weakness, numbness, tingling. Denies AC use.    PAST MEDICAL HISTORY   Reflux gastritis  Hyperlipidemia    PAST SURGICAL HISTORY     iodine (Hives)  No Known Drug Allergies  shellfish (Hives)      MEDICATIONS:  Antibiotics:    Neuro:  acetaminophen   Tablet. 650 milliGRAM(s) Oral every 6 hours  oxyCODONE    IR 5 milliGRAM(s) Oral every 4 hours PRN  oxyCODONE    IR 10 milliGRAM(s) Oral every 4 hours PRN  traMADol 25 milliGRAM(s) Oral every 4 hours PRN    Anticoagulation:  enoxaparin Injectable 40 milliGRAM(s) SubCutaneous daily    Other:  docusate sodium 100 milliGRAM(s) Oral three times a day  pantoprazole    Tablet 40 milliGRAM(s) Oral daily  senna 2 Tablet(s) Oral at bedtime    FAMILY HISTORY:    REVIEW OF SYSTEMS:  Check here if all are normal other than Neurological []  General:  Eyes:  ENT:  Cardiac:  Respiratory: SOB  GI:  Musculoskeletal: chest pain  Skin:  Neurologic:   Psychiatric:     PHYSICAL EXAMINATION:   T(C): 36.6 (01-14-18 @ 08:14), Max: 36.9 (01-13-18 @ 18:03)  HR: 62 (01-14-18 @ 08:14) (60 - 72)  BP: 122/81 (01-14-18 @ 08:14) (120/72 - 134/75)  RR: 16 (01-14-18 @ 08:14) (16 - 18)  SpO2: 95% (01-14-18 @ 08:14) (95% - 97%)  Wt(kg): --Height (cm): 172.72 (01-14 @ 05:55)  Weight (kg): 72.6 (01-14 @ 05:55)    AOx3, Following Commands    CN: PERRL, EOMI, no facial droop    Motor: 5/5 throughout, no drift    Sensation intact to light touch    Reflexes: no clonus     LABS:                        11.2   4.74  )-----------( 180      ( 14 Jan 2018 09:14 )             34.3     01-14    138  |  101  |  10  ----------------------------<  110<H>  3.9   |  25  |  0.74    Ca    9.4      14 Jan 2018 09:20  Phos  3.0     01-14  Mg     2.0     01-14    TPro  7.1  /  Alb  3.7  /  TBili  0.4  /  DBili  x   /  AST  35  /  ALT  34  /  AlkPhos  47  01-12      Pager: [phone]

## 2018-01-16 RX ORDER — SENNA PLUS 8.6 MG/1
2 TABLET ORAL
Qty: 10 | Refills: 0 | OUTPATIENT
Start: 2018-01-16 | End: 2018-01-20

## 2018-01-25 ENCOUNTER — APPOINTMENT (OUTPATIENT)
Dept: NEUROSURGERY | Facility: CLINIC | Age: 63
End: 2018-01-25
Payer: COMMERCIAL

## 2018-01-25 VITALS
HEART RATE: 66 BPM | WEIGHT: 160 LBS | HEIGHT: 68 IN | BODY MASS INDEX: 24.25 KG/M2 | SYSTOLIC BLOOD PRESSURE: 134 MMHG | DIASTOLIC BLOOD PRESSURE: 80 MMHG

## 2018-01-25 PROCEDURE — 99204 OFFICE O/P NEW MOD 45 MIN: CPT

## 2018-01-26 ENCOUNTER — APPOINTMENT (OUTPATIENT)
Dept: TRAUMA SURGERY | Facility: CLINIC | Age: 63
End: 2018-01-26

## 2018-01-30 ENCOUNTER — APPOINTMENT (OUTPATIENT)
Dept: TRAUMA SURGERY | Facility: CLINIC | Age: 63
End: 2018-01-30
Payer: COMMERCIAL

## 2018-01-30 VITALS
HEIGHT: 68 IN | TEMPERATURE: 98.2 F | WEIGHT: 160 LBS | BODY MASS INDEX: 24.25 KG/M2 | HEART RATE: 68 BPM | SYSTOLIC BLOOD PRESSURE: 136 MMHG | DIASTOLIC BLOOD PRESSURE: 76 MMHG

## 2018-01-30 PROCEDURE — 99211 OFF/OP EST MAY X REQ PHY/QHP: CPT

## 2018-02-06 ENCOUNTER — APPOINTMENT (OUTPATIENT)
Dept: THORACIC SURGERY | Facility: CLINIC | Age: 63
End: 2018-02-06
Payer: COMMERCIAL

## 2018-02-06 VITALS
HEART RATE: 87 BPM | TEMPERATURE: 98.6 F | OXYGEN SATURATION: 96 % | BODY MASS INDEX: 24.25 KG/M2 | HEIGHT: 68 IN | DIASTOLIC BLOOD PRESSURE: 89 MMHG | SYSTOLIC BLOOD PRESSURE: 159 MMHG | RESPIRATION RATE: 18 BRPM | WEIGHT: 160 LBS

## 2018-02-06 DIAGNOSIS — Z87.891 PERSONAL HISTORY OF NICOTINE DEPENDENCE: ICD-10-CM

## 2018-02-06 PROCEDURE — 99204 OFFICE O/P NEW MOD 45 MIN: CPT

## 2018-02-08 PROBLEM — Z87.891 FORMER SMOKER: Status: ACTIVE | Noted: 2018-02-08

## 2018-02-08 RX ORDER — CLARITHROMYCIN 500 MG/1
500 TABLET, FILM COATED ORAL
Qty: 20 | Refills: 0 | Status: COMPLETED | COMMUNITY
Start: 2017-11-21

## 2018-02-08 RX ORDER — OXYCODONE 5 MG/1
5 TABLET ORAL
Qty: 18 | Refills: 0 | Status: COMPLETED | COMMUNITY
Start: 2018-01-14

## 2018-02-08 RX ORDER — AZITHROMYCIN 250 MG/1
250 TABLET, FILM COATED ORAL
Qty: 6 | Refills: 0 | Status: COMPLETED | COMMUNITY
Start: 2017-11-02

## 2018-02-08 RX ORDER — ASPIRIN ENTERIC COATED TABLETS 81 MG 81 MG/1
81 TABLET, DELAYED RELEASE ORAL
Refills: 0 | Status: COMPLETED | COMMUNITY
End: 2018-02-08

## 2018-02-08 RX ORDER — SULFAMETHOXAZOLE AND TRIMETHOPRIM 800; 160 MG/1; MG/1
800-160 TABLET ORAL
Qty: 20 | Refills: 0 | Status: COMPLETED | COMMUNITY
Start: 2018-01-16

## 2018-02-08 RX ORDER — GENTAMICIN SULFATE 1 MG/G
0.1 OINTMENT TOPICAL
Qty: 30 | Refills: 0 | Status: COMPLETED | COMMUNITY
Start: 2018-01-16

## 2018-02-08 RX ORDER — CHLORHEXIDINE GLUCONATE 4 %
LIQUID (ML) TOPICAL
Refills: 0 | Status: COMPLETED | COMMUNITY
End: 2018-02-08

## 2018-02-09 ENCOUNTER — APPOINTMENT (OUTPATIENT)
Dept: NEUROSURGERY | Facility: CLINIC | Age: 63
End: 2018-02-09
Payer: COMMERCIAL

## 2018-02-09 VITALS
DIASTOLIC BLOOD PRESSURE: 74 MMHG | HEIGHT: 68 IN | HEART RATE: 73 BPM | SYSTOLIC BLOOD PRESSURE: 144 MMHG | WEIGHT: 158 LBS | BODY MASS INDEX: 23.95 KG/M2

## 2018-02-09 PROCEDURE — 99214 OFFICE O/P EST MOD 30 MIN: CPT

## 2018-03-05 ENCOUNTER — FORM ENCOUNTER (OUTPATIENT)
Age: 63
End: 2018-03-05

## 2018-03-06 ENCOUNTER — APPOINTMENT (OUTPATIENT)
Dept: RADIOLOGY | Facility: HOSPITAL | Age: 63
End: 2018-03-06

## 2018-03-06 ENCOUNTER — APPOINTMENT (OUTPATIENT)
Dept: THORACIC SURGERY | Facility: CLINIC | Age: 63
End: 2018-03-06
Payer: COMMERCIAL

## 2018-03-06 ENCOUNTER — OUTPATIENT (OUTPATIENT)
Dept: OUTPATIENT SERVICES | Facility: HOSPITAL | Age: 63
LOS: 1 days | End: 2018-03-06
Payer: COMMERCIAL

## 2018-03-06 VITALS
OXYGEN SATURATION: 97 % | SYSTOLIC BLOOD PRESSURE: 144 MMHG | HEART RATE: 81 BPM | RESPIRATION RATE: 18 BRPM | HEIGHT: 68 IN | WEIGHT: 156 LBS | BODY MASS INDEX: 23.64 KG/M2 | DIASTOLIC BLOOD PRESSURE: 84 MMHG

## 2018-03-06 DIAGNOSIS — J94.2 HEMOTHORAX: ICD-10-CM

## 2018-03-06 PROCEDURE — 99213 OFFICE O/P EST LOW 20 MIN: CPT

## 2018-03-06 PROCEDURE — 71046 X-RAY EXAM CHEST 2 VIEWS: CPT | Mod: 26

## 2018-06-26 ENCOUNTER — MEDICATION RENEWAL (OUTPATIENT)
Age: 63
End: 2018-06-26

## 2018-09-04 ENCOUNTER — RX RENEWAL (OUTPATIENT)
Age: 63
End: 2018-09-04

## 2018-12-05 ENCOUNTER — MEDICATION RENEWAL (OUTPATIENT)
Age: 63
End: 2018-12-05

## 2018-12-06 PROBLEM — E78.5 HYPERLIPIDEMIA, UNSPECIFIED: Chronic | Status: ACTIVE | Noted: 2018-01-12

## 2018-12-06 PROBLEM — K29.60 OTHER GASTRITIS WITHOUT BLEEDING: Chronic | Status: ACTIVE | Noted: 2018-01-12

## 2019-01-02 ENCOUNTER — APPOINTMENT (OUTPATIENT)
Dept: INTERNAL MEDICINE | Facility: CLINIC | Age: 64
End: 2019-01-02
Payer: COMMERCIAL

## 2019-01-02 VITALS
DIASTOLIC BLOOD PRESSURE: 60 MMHG | WEIGHT: 164 LBS | SYSTOLIC BLOOD PRESSURE: 120 MMHG | TEMPERATURE: 98.2 F | OXYGEN SATURATION: 98 % | HEART RATE: 67 BPM | HEIGHT: 68 IN | BODY MASS INDEX: 24.86 KG/M2

## 2019-01-02 DIAGNOSIS — S22.42XD MULTIPLE FRACTURES OF RIBS, LEFT SIDE, SUBSEQUENT ENCOUNTER FOR FRACTURE WITH ROUTINE HEALING: ICD-10-CM

## 2019-01-02 DIAGNOSIS — Z11.59 ENCOUNTER FOR SCREENING FOR OTHER VIRAL DISEASES: ICD-10-CM

## 2019-01-02 DIAGNOSIS — S06.0X0D CONCUSSION W/OUT LOSS OF CONSCIOUSNESS, SUBSEQUENT ENCOUNTER: ICD-10-CM

## 2019-01-02 DIAGNOSIS — J94.2 HEMOTHORAX: ICD-10-CM

## 2019-01-02 DIAGNOSIS — R52 PAIN, UNSPECIFIED: ICD-10-CM

## 2019-01-02 PROCEDURE — 99396 PREV VISIT EST AGE 40-64: CPT | Mod: 25

## 2019-01-02 PROCEDURE — G0444 DEPRESSION SCREEN ANNUAL: CPT

## 2019-01-02 PROCEDURE — 36415 COLL VENOUS BLD VENIPUNCTURE: CPT

## 2019-01-02 NOTE — PHYSICAL EXAM
[No Acute Distress] : no acute distress [Well Nourished] : well nourished [Well Developed] : well developed [Well-Appearing] : well-appearing [Normal Voice/Communication] : normal voice/communication [Normal Sclera/Conjunctiva] : normal sclera/conjunctiva [PERRL] : pupils equal round and reactive to light [Normal Outer Ear/Nose] : the outer ears and nose were normal in appearance [Normal Oropharynx] : the oropharynx was normal [Normal TMs] : both tympanic membranes were normal [No JVD] : no jugular venous distention [Supple] : supple [No Lymphadenopathy] : no lymphadenopathy [Thyroid Normal, No Nodules] : the thyroid was normal and there were no nodules present [No Respiratory Distress] : no respiratory distress  [Clear to Auscultation] : lungs were clear to auscultation bilaterally [No Accessory Muscle Use] : no accessory muscle use [Normal Rate] : normal rate  [Regular Rhythm] : with a regular rhythm [Normal S1, S2] : normal S1 and S2 [No Murmur] : no murmur heard [No Carotid Bruits] : no carotid bruits [No Abdominal Bruit] : a ~M bruit was not heard ~T in the abdomen [No Varicosities] : no varicosities [Pedal Pulses Present] : the pedal pulses are present [No Edema] : there was no peripheral edema [No Extremity Clubbing/Cyanosis] : no extremity clubbing/cyanosis [Soft] : abdomen soft [Non Tender] : non-tender [Non-distended] : non-distended [No Masses] : no abdominal mass palpated [No HSM] : no HSM [Normal Bowel Sounds] : normal bowel sounds [Normal Sphincter Tone] : normal sphincter tone [No Mass] : no mass [Prostate Enlargement] : the prostate was not enlarged [Prostate Tenderness] : the prostate was not tender [No Prostate Nodules] : no prostate nodules [Normal Supraclavicular Nodes] : no supraclavicular lymphadenopathy [Normal Posterior Cervical Nodes] : no posterior cervical lymphadenopathy [Normal Anterior Cervical Nodes] : no anterior cervical lymphadenopathy [No Spinal Tenderness] : no spinal tenderness [No Joint Swelling] : no joint swelling [Grossly Normal Strength/Tone] : grossly normal strength/tone [No Rash] : no rash [Normal Gait] : normal gait [Coordination Grossly Intact] : coordination grossly intact [No Focal Deficits] : no focal deficits [Speech Grossly Normal] : speech grossly normal [Normal Affect] : the affect was normal [Normal Mood] : the mood was normal [Normal Insight/Judgement] : insight and judgment were intact [de-identified] : small umbilical hernia present, nontender, nonreducible

## 2019-01-02 NOTE — ASSESSMENT
[FreeTextEntry1] : discussed w pt \par \par check routine labs as below \par \par cont current rx \par \par reviewed his records from past year including cardiology workup w normal stress echo and carotid dopplers in 9/18 \par \par he reports colonoscopy normal few months ago and EGD as well, will obtain record\par \par cont current rx, diet control, monitor lipids - may consider restarting ezetimibe for lipid control \par \par advised to monitor small umbilical hernia, pt reports it is unchanged for many years, consider surgical repair if it is worsening \par \par ortho f/u as scheduled \par \par GI f/u as scheduled for EGD surveillance for Barretts, cont PPI \par \par he had influenza vaccine this season. consider Shingrix vaccine when it is available \par \par RTO 6 months for routine f/u or earlier prn if any new concerns

## 2019-01-02 NOTE — HISTORY OF PRESENT ILLNESS
[FreeTextEntry1] : \par 64 y/o man presents for annual exam. he feels well w no new concerns. no additional problems since he fell off a roof about one year ago with rib fractures, small hemothorax, followed w thoracic surgery team w no intervention required. \par \par hx of Barretts esophagus s/p high freq ablation, follows w Dr Joaquin GI. had EGD, colonoscopy done few months ago as per pt, no abnormalities as per pt. on high dose PPI. \par \par hx of familial hyperlipidemia, on high dose rosuvastatin w moderate control of lipids. he had been on Zetia in the past but stopped on his own\par \par he has likely SERA and has declined to use CPAP

## 2019-01-02 NOTE — HEALTH RISK ASSESSMENT
[Any fall with injury in past year] : Patient reported fall with injury in the past year [0] : 2) Feeling down, depressed, or hopeless: Not at all (0) [Patient reported colonoscopy was normal] : Patient reported colonoscopy was normal [Hepatitis C test offered] : Hepatitis C test offered [None] : None [Employed] : employed [] :  [] : No [ColonoscopyDate] : 08/18

## 2019-01-14 LAB
ALBUMIN SERPL ELPH-MCNC: 4.5 G/DL
ALP BLD-CCNC: 46 U/L
ALT SERPL-CCNC: 21 U/L
ANION GAP SERPL CALC-SCNC: 10 MMOL/L
APPEARANCE: CLEAR
AST SERPL-CCNC: 23 U/L
BASOPHILS # BLD AUTO: 0.08 K/UL
BASOPHILS NFR BLD AUTO: 1.7 %
BILIRUB SERPL-MCNC: 0.3 MG/DL
BILIRUBIN URINE: NEGATIVE
BLOOD URINE: NEGATIVE
BUN SERPL-MCNC: 13 MG/DL
CALCIUM SERPL-MCNC: 9.2 MG/DL
CHLORIDE SERPL-SCNC: 103 MMOL/L
CHOLEST SERPL-MCNC: 262 MG/DL
CHOLEST/HDLC SERPL: 3.3 RATIO
CO2 SERPL-SCNC: 25 MMOL/L
COLOR: YELLOW
CREAT SERPL-MCNC: 0.84 MG/DL
EOSINOPHIL # BLD AUTO: 0.58 K/UL
EOSINOPHIL NFR BLD AUTO: 12.5 %
GLUCOSE QUALITATIVE U: NEGATIVE MG/DL
GLUCOSE SERPL-MCNC: 92 MG/DL
HBA1C MFR BLD HPLC: 5.7 %
HCT VFR BLD CALC: 42.1 %
HCV AB SER QL: NONREACTIVE
HCV S/CO RATIO: 0.27 S/CO
HDLC SERPL-MCNC: 79 MG/DL
HGB BLD-MCNC: 13.2 G/DL
IMM GRANULOCYTES NFR BLD AUTO: 0.2 %
KETONES URINE: NEGATIVE
LDLC SERPL CALC-MCNC: 165 MG/DL
LEUKOCYTE ESTERASE URINE: NEGATIVE
LYMPHOCYTES # BLD AUTO: 1.32 K/UL
LYMPHOCYTES NFR BLD AUTO: 28.4 %
MAN DIFF?: NORMAL
MCHC RBC-ENTMCNC: 30.2 PG
MCHC RBC-ENTMCNC: 31.4 GM/DL
MCV RBC AUTO: 96.3 FL
MONOCYTES # BLD AUTO: 0.32 K/UL
MONOCYTES NFR BLD AUTO: 6.9 %
NEUTROPHILS # BLD AUTO: 2.33 K/UL
NEUTROPHILS NFR BLD AUTO: 50.3 %
NITRITE URINE: NEGATIVE
PH URINE: 7.5
PLATELET # BLD AUTO: 256 K/UL
POTASSIUM SERPL-SCNC: 4.5 MMOL/L
PROT SERPL-MCNC: 7.3 G/DL
PROTEIN URINE: NEGATIVE MG/DL
PSA SERPL-MCNC: 2.42 NG/ML
RBC # BLD: 4.37 M/UL
RBC # FLD: 14.2 %
SODIUM SERPL-SCNC: 138 MMOL/L
SPECIFIC GRAVITY URINE: 1.01
T4 FREE SERPL-MCNC: 1.1 NG/DL
TRIGL SERPL-MCNC: 90 MG/DL
TSH SERPL-ACNC: 1.67 UIU/ML
UROBILINOGEN URINE: NEGATIVE MG/DL
WBC # FLD AUTO: 4.64 K/UL

## 2019-01-22 ENCOUNTER — MEDICATION RENEWAL (OUTPATIENT)
Age: 64
End: 2019-01-22

## 2019-03-27 ENCOUNTER — TRANSCRIPTION ENCOUNTER (OUTPATIENT)
Age: 64
End: 2019-03-27

## 2019-03-28 ENCOUNTER — APPOINTMENT (OUTPATIENT)
Dept: SURGERY | Facility: CLINIC | Age: 64
End: 2019-03-28
Payer: COMMERCIAL

## 2019-03-28 VITALS
WEIGHT: 165 LBS | RESPIRATION RATE: 15 BRPM | TEMPERATURE: 98 F | HEIGHT: 68 IN | BODY MASS INDEX: 25.01 KG/M2 | DIASTOLIC BLOOD PRESSURE: 81 MMHG | SYSTOLIC BLOOD PRESSURE: 139 MMHG | OXYGEN SATURATION: 99 % | HEART RATE: 60 BPM

## 2019-03-28 PROCEDURE — 99244 OFF/OP CNSLTJ NEW/EST MOD 40: CPT

## 2019-03-28 NOTE — REASON FOR VISIT
[Consultation] : a consultation visit [FreeTextEntry1] : referred by Dr. Rajendra Walker for an umbilical

## 2019-03-28 NOTE — ASSESSMENT
[FreeTextEntry1] : This patient is suffering from an asymptomatic incarcerated umbilical hernia. The patient wishes to postpone repair of his hernia for several weeks despite my warnings possible incarceration and strangulation

## 2019-03-28 NOTE — HISTORY OF PRESENT ILLNESS
[de-identified] : This 63-year-old patient has had an umbilical hernia for many many years. He denies any pain nausea or change in bowel or urinary habits. He feels that the hernia has grown in size over the several months and is now interested in surgical repair

## 2019-03-28 NOTE — PHYSICAL EXAM
[Normal Breath Sounds] : Normal breath sounds [Normal Heart Sounds] : normal heart sounds [No Rash or Lesion] : No rash or lesion [Calm] : calm [de-identified] : ormal [de-identified] : normal [de-identified] : ormal [de-identified] : Soft nontender and nondistended. There is a nonreducible umbilical hernia which is nontender [de-identified] : ormal [de-identified] : ormal [de-identified] : ormal

## 2019-03-28 NOTE — CONSULT LETTER
[Dear  ___] : Dear  [unfilled], [Consult Letter:] : I had the pleasure of evaluating your patient, [unfilled]. [Please see my note below.] : Please see my note below. [Sincerely,] : Sincerely, [FreeTextEntry3] : Enoc Garcia M.D.

## 2019-03-30 DIAGNOSIS — Z22.321 CARRIER OR SUSPECTED CARRIER OF METHICILLIN SUSCEPTIBLE STAPHYLOCOCCUS AUREUS: ICD-10-CM

## 2019-03-30 LAB
MRSA SPEC QL CULT: NOT DETECTED
STAPH AUREUS (SA): DETECTED

## 2019-04-15 ENCOUNTER — OUTPATIENT (OUTPATIENT)
Dept: OUTPATIENT SERVICES | Facility: HOSPITAL | Age: 64
LOS: 1 days | End: 2019-04-15
Payer: COMMERCIAL

## 2019-04-15 VITALS
SYSTOLIC BLOOD PRESSURE: 140 MMHG | HEIGHT: 68 IN | WEIGHT: 169.98 LBS | RESPIRATION RATE: 16 BRPM | TEMPERATURE: 97 F | HEART RATE: 67 BPM | OXYGEN SATURATION: 99 % | DIASTOLIC BLOOD PRESSURE: 66 MMHG

## 2019-04-15 DIAGNOSIS — K22.70 BARRETT'S ESOPHAGUS WITHOUT DYSPLASIA: ICD-10-CM

## 2019-04-15 DIAGNOSIS — Z91.81 HISTORY OF FALLING: ICD-10-CM

## 2019-04-15 DIAGNOSIS — Z01.84 ENCOUNTER FOR ANTIBODY RESPONSE EXAMINATION: ICD-10-CM

## 2019-04-15 DIAGNOSIS — K42.9 UMBILICAL HERNIA WITHOUT OBSTRUCTION OR GANGRENE: ICD-10-CM

## 2019-04-15 DIAGNOSIS — G47.33 OBSTRUCTIVE SLEEP APNEA (ADULT) (PEDIATRIC): ICD-10-CM

## 2019-04-15 LAB
ANION GAP SERPL CALC-SCNC: 11 MMOL/L — SIGNIFICANT CHANGE UP (ref 5–17)
BUN SERPL-MCNC: 12 MG/DL — SIGNIFICANT CHANGE UP (ref 7–23)
CALCIUM SERPL-MCNC: 9.4 MG/DL — SIGNIFICANT CHANGE UP (ref 8.4–10.5)
CHLORIDE SERPL-SCNC: 106 MMOL/L — SIGNIFICANT CHANGE UP (ref 96–108)
CO2 SERPL-SCNC: 21 MMOL/L — LOW (ref 22–31)
CREAT SERPL-MCNC: 0.74 MG/DL — SIGNIFICANT CHANGE UP (ref 0.5–1.3)
GLUCOSE SERPL-MCNC: 75 MG/DL — SIGNIFICANT CHANGE UP (ref 70–99)
HCT VFR BLD CALC: 40.3 % — SIGNIFICANT CHANGE UP (ref 39–50)
HGB BLD-MCNC: 12.5 G/DL — LOW (ref 13–17)
MCHC RBC-ENTMCNC: 30.6 PG — SIGNIFICANT CHANGE UP (ref 27–34)
MCHC RBC-ENTMCNC: 31 GM/DL — LOW (ref 32–36)
MCV RBC AUTO: 98.8 FL — SIGNIFICANT CHANGE UP (ref 80–100)
PLATELET # BLD AUTO: 223 K/UL — SIGNIFICANT CHANGE UP (ref 150–400)
POTASSIUM SERPL-MCNC: 4.4 MMOL/L — SIGNIFICANT CHANGE UP (ref 3.5–5.3)
POTASSIUM SERPL-SCNC: 4.4 MMOL/L — SIGNIFICANT CHANGE UP (ref 3.5–5.3)
RBC # BLD: 4.08 M/UL — LOW (ref 4.2–5.8)
RBC # FLD: 14.9 % — HIGH (ref 10.3–14.5)
SODIUM SERPL-SCNC: 138 MMOL/L — SIGNIFICANT CHANGE UP (ref 135–145)
WBC # BLD: 3.51 K/UL — LOW (ref 3.8–10.5)
WBC # FLD AUTO: 3.51 K/UL — LOW (ref 3.8–10.5)

## 2019-04-15 PROCEDURE — 85027 COMPLETE CBC AUTOMATED: CPT

## 2019-04-15 PROCEDURE — G0463: CPT

## 2019-04-15 PROCEDURE — 80048 BASIC METABOLIC PNL TOTAL CA: CPT

## 2019-04-15 RX ORDER — SODIUM CHLORIDE 9 MG/ML
3 INJECTION INTRAMUSCULAR; INTRAVENOUS; SUBCUTANEOUS EVERY 8 HOURS
Qty: 0 | Refills: 0 | Status: DISCONTINUED | OUTPATIENT
Start: 2019-04-24 | End: 2019-05-09

## 2019-04-15 RX ORDER — OMEPRAZOLE 10 MG/1
0 CAPSULE, DELAYED RELEASE ORAL
Qty: 0 | Refills: 0 | COMMUNITY

## 2019-04-15 RX ORDER — LIDOCAINE HCL 20 MG/ML
0.2 VIAL (ML) INJECTION ONCE
Qty: 0 | Refills: 0 | Status: DISCONTINUED | OUTPATIENT
Start: 2019-04-24 | End: 2019-05-09

## 2019-04-15 RX ORDER — ROSUVASTATIN CALCIUM 5 MG/1
0 TABLET ORAL
Qty: 0 | Refills: 0 | COMMUNITY

## 2019-04-15 NOTE — H&P PST ADULT - NSICDXPROBLEM_GEN_ALL_CORE_FT
PROBLEM DIAGNOSES  Problem: Hernia, umbilical  Assessment and Plan: Umbilical hernia repair    Problem: Barretts esophagus  Assessment and Plan: Instructed to continue meds &  take with sips of water in AM the day of surgery     Problem: SERA (obstructive sleep apnea)  Assessment and Plan: Informed OR booking for precautions.

## 2019-04-15 NOTE — H&P PST ADULT - NSANTHOSAYNRD_GEN_A_CORE
No. SERA screening performed.  STOP BANG Legend: 0-2 = LOW Risk; 3-4 = INTERMEDIATE Risk; 5-8 = HIGH Risk

## 2019-04-15 NOTE — H&P PST ADULT - HISTORY OF PRESENT ILLNESS
63 year old male 63 year old male reports having umbilical hernia for many years, noticed its grown in size recently, scheduled for Umbilical hernia repair on 04/24/19.

## 2019-04-15 NOTE — H&P PST ADULT - NEGATIVE MUSCULOSKELETAL SYMPTOMS
no joint swelling/no muscle cramps/no muscle weakness/no neck pain/no leg pain R/no stiffness/no arm pain L/no arm pain R/no leg pain L

## 2019-04-15 NOTE — H&P PST ADULT - NEGATIVE GASTROINTESTINAL SYMPTOMS
no change in bowel habits/no flatulence/no abdominal pain/no melena/no vomiting/no constipation/no nausea

## 2019-04-15 NOTE — H&P PST ADULT - NSICDXPASTMEDICALHX_GEN_ALL_CORE_FT
PAST MEDICAL HISTORY:  Barretts esophagus h/o ablation 2013    Hyperlipidemia     Reflux gastritis PAST MEDICAL HISTORY:  Gardner's esophagus h/o ablation 2013    Hyperlipidemia     Reflux gastritis

## 2019-04-16 ENCOUNTER — APPOINTMENT (OUTPATIENT)
Dept: INTERNAL MEDICINE | Facility: CLINIC | Age: 64
End: 2019-04-16
Payer: COMMERCIAL

## 2019-04-16 VITALS
HEIGHT: 68 IN | DIASTOLIC BLOOD PRESSURE: 70 MMHG | WEIGHT: 170 LBS | OXYGEN SATURATION: 98 % | TEMPERATURE: 97.9 F | HEART RATE: 70 BPM | BODY MASS INDEX: 25.76 KG/M2 | SYSTOLIC BLOOD PRESSURE: 124 MMHG

## 2019-04-16 PROBLEM — Z91.81 HISTORY OF FALLING: Chronic | Status: ACTIVE | Noted: 2019-04-15

## 2019-04-16 PROBLEM — K22.70 BARRETT'S ESOPHAGUS WITHOUT DYSPLASIA: Chronic | Status: ACTIVE | Noted: 2019-04-15

## 2019-04-16 PROCEDURE — 99244 OFF/OP CNSLTJ NEW/EST MOD 40: CPT

## 2019-04-16 NOTE — PLAN
[FreeTextEntry1] : discussed w pt \par \par reviewed preop lab testing and most recent EKG and stress echo with his cardiologist from 9/18 which was unremarkable. \par \par no contraindications to the planned low risk surgery \par \par please call with any questions \par \par RTO 4 months for routine f/u or earlier prn if any new concerns

## 2019-04-16 NOTE — ASSESSMENT
[Patient Optimized for Surgery] : Patient optimized for surgery [No Further Testing Recommended] : no further testing recommended [Modify anti-platelet treatment prior to procedure] : Modify anti-platelet treatment prior to procedure [Continue medications as is] : Continue current medications [As per surgery] : as per surgery [FreeTextEntry6] : hold ASA one week prior to surgery

## 2019-04-16 NOTE — PHYSICAL EXAM
[No Acute Distress] : no acute distress [Well-Appearing] : well-appearing [Normal Voice/Communication] : normal voice/communication [No JVD] : no jugular venous distention [No Respiratory Distress] : no respiratory distress  [Supple] : supple [No Accessory Muscle Use] : no accessory muscle use [Clear to Auscultation] : lungs were clear to auscultation bilaterally [Normal Rate] : normal rate  [Regular Rhythm] : with a regular rhythm [Normal S1, S2] : normal S1 and S2 [No Murmur] : no murmur heard [No Edema] : there was no peripheral edema [Normal Gait] : normal gait [No Carotid Bruits] : no carotid bruits [Coordination Grossly Intact] : coordination grossly intact [Normal Affect] : the affect was normal [Normal Mood] : the mood was normal [Normal Insight/Judgement] : insight and judgment were intact [de-identified] : umbilical hernia, nonreducible

## 2019-04-16 NOTE — HISTORY OF PRESENT ILLNESS
[No Pertinent Cardiac History] : no history of aortic stenosis, atrial fibrillation, coronary artery disease, recent myocardial infarction, or implantable device/pacemaker [No Pertinent Pulmonary History] : no history of asthma, COPD, sleep apnea, or smoking [No Adverse Anesthesia Reaction] : no adverse anesthesia reaction in self or family member [(Patient denies any chest pain, claudication, dyspnea on exertion, orthopnea, palpitations or syncope)] : Patient denies any chest pain, claudication, dyspnea on exertion, orthopnea, palpitations or syncope [Good (7-10 METs)] : Good (7-10 METs) [Diabetes] : no diabetes [Chronic Anticoagulation] : no chronic anticoagulation [FreeTextEntry1] : umbilical hernia repair  [FreeTextEntry2] : 4/24/19 [FreeTextEntry3] : - Dr Enoc Garcia - [place] ambulatory  [FreeTextEntry4] : \par presents for medical evaluation prior to planned umbilical hernia repair. he feels well currently w no new concerns. no known history of cardiovascular disease, no hx of bleeding problems or thromboembolism. he had normal routine stress echocardiogram done w his cardiologist Dr Payton in 9/18, EKG normal. \par \par hx of familial hyperlipidemia on rx \par Barretts esophagus stable, monitoring w GI \par hx of SERA but he does not use CPAP regularly \par \par

## 2019-04-23 ENCOUNTER — TRANSCRIPTION ENCOUNTER (OUTPATIENT)
Age: 64
End: 2019-04-23

## 2019-04-24 ENCOUNTER — OUTPATIENT (OUTPATIENT)
Dept: OUTPATIENT SERVICES | Facility: HOSPITAL | Age: 64
LOS: 1 days | End: 2019-04-24
Payer: COMMERCIAL

## 2019-04-24 ENCOUNTER — APPOINTMENT (OUTPATIENT)
Dept: SURGERY | Facility: HOSPITAL | Age: 64
End: 2019-04-24
Payer: COMMERCIAL

## 2019-04-24 VITALS — OXYGEN SATURATION: 97 % | DIASTOLIC BLOOD PRESSURE: 62 MMHG | SYSTOLIC BLOOD PRESSURE: 130 MMHG | HEART RATE: 59 BPM

## 2019-04-24 VITALS
DIASTOLIC BLOOD PRESSURE: 74 MMHG | TEMPERATURE: 98 F | HEART RATE: 67 BPM | OXYGEN SATURATION: 99 % | HEIGHT: 68 IN | SYSTOLIC BLOOD PRESSURE: 140 MMHG | WEIGHT: 169.98 LBS | RESPIRATION RATE: 16 BRPM

## 2019-04-24 DIAGNOSIS — K42.9 UMBILICAL HERNIA WITHOUT OBSTRUCTION OR GANGRENE: ICD-10-CM

## 2019-04-24 PROCEDURE — 49587: CPT

## 2019-04-24 RX ORDER — ROSUVASTATIN CALCIUM 5 MG/1
1 TABLET ORAL
Qty: 0 | Refills: 0 | COMMUNITY

## 2019-04-24 RX ORDER — ONDANSETRON 8 MG/1
4 TABLET, FILM COATED ORAL ONCE
Qty: 0 | Refills: 0 | Status: DISCONTINUED | OUTPATIENT
Start: 2019-04-24 | End: 2019-05-09

## 2019-04-24 RX ORDER — EZETIMIBE 10 MG/1
1 TABLET ORAL
Qty: 0 | Refills: 0 | COMMUNITY

## 2019-04-24 RX ORDER — CELECOXIB 200 MG/1
200 CAPSULE ORAL ONCE
Qty: 0 | Refills: 0 | Status: DISCONTINUED | OUTPATIENT
Start: 2019-04-24 | End: 2019-05-09

## 2019-04-24 RX ORDER — UBIDECARENONE 100 MG
1 CAPSULE ORAL
Qty: 0 | Refills: 0 | COMMUNITY

## 2019-04-24 RX ORDER — OMEPRAZOLE 10 MG/1
1 CAPSULE, DELAYED RELEASE ORAL
Qty: 0 | Refills: 0 | COMMUNITY

## 2019-04-24 RX ORDER — PSYLLIUM SEED (WITH DEXTROSE)
0 POWDER (GRAM) ORAL
Qty: 0 | Refills: 0 | COMMUNITY

## 2019-04-24 RX ORDER — CEFOTETAN DISODIUM 1 G
2 VIAL (EA) INJECTION ONCE
Qty: 0 | Refills: 0 | Status: COMPLETED | OUTPATIENT
Start: 2019-04-24 | End: 2019-04-24

## 2019-04-24 RX ORDER — ACETAMINOPHEN 500 MG
1000 TABLET ORAL ONCE
Qty: 0 | Refills: 0 | Status: COMPLETED | OUTPATIENT
Start: 2019-04-24 | End: 2019-04-24

## 2019-04-24 RX ORDER — L.ACIDOPH/B.ANIMALIS/B.LONGUM 15B CELL
1 CAPSULE ORAL
Qty: 0 | Refills: 0 | COMMUNITY

## 2019-04-24 RX ORDER — OXYCODONE HYDROCHLORIDE 5 MG/1
5 TABLET ORAL ONCE
Qty: 0 | Refills: 0 | Status: DISCONTINUED | OUTPATIENT
Start: 2019-04-24 | End: 2019-04-24

## 2019-04-24 RX ORDER — SODIUM CHLORIDE 9 MG/ML
1000 INJECTION, SOLUTION INTRAVENOUS
Qty: 0 | Refills: 0 | Status: DISCONTINUED | OUTPATIENT
Start: 2019-04-24 | End: 2019-05-09

## 2019-04-24 RX ORDER — CHLORHEXIDINE GLUCONATE 213 G/1000ML
1 SOLUTION TOPICAL ONCE
Qty: 0 | Refills: 0 | Status: COMPLETED | OUTPATIENT
Start: 2019-04-24 | End: 2019-04-24

## 2019-04-24 RX ORDER — CELECOXIB 200 MG/1
200 CAPSULE ORAL ONCE
Qty: 0 | Refills: 0 | Status: COMPLETED | OUTPATIENT
Start: 2019-04-24 | End: 2019-04-24

## 2019-04-24 RX ADMIN — CELECOXIB 200 MILLIGRAM(S): 200 CAPSULE ORAL at 07:22

## 2019-04-24 RX ADMIN — Medication 1000 MILLIGRAM(S): at 07:22

## 2019-04-24 RX ADMIN — CHLORHEXIDINE GLUCONATE 1 APPLICATION(S): 213 SOLUTION TOPICAL at 07:27

## 2019-04-24 NOTE — BRIEF OPERATIVE NOTE - OPERATION/FINDINGS
Incision made superior to the umbilicus. Defect (1-2 cm) was identified. Herniated preperitoneal fat was able to be reduced and the defect was closed with prolene sutures with bilateral figure of 8 stitches. Portion of the umbilical stalk was excised prior to placed the figure of 8 stitches. An additional prolene stitch was placed between the two figure of 8 stitches along the midline to close the defect. Single 3-0 vicryl suture used to approximate the wound edges at the midline. Subcutical stitch performed with 4-0 Monocryl. Minimal blood loss.

## 2019-04-24 NOTE — ASU DISCHARGE PLAN (ADULT/PEDIATRIC) - CARE PROVIDER_API CALL
Enoc Garcia)  Surgery  [address], [address]  [place] [address]  Phone: [phone]  Fax: [phone]  Follow Up Time:

## 2019-04-24 NOTE — ASU PATIENT PROFILE, ADULT - PMH
Barretts esophagus  h/o ablation 2013  H/O fall  off a roof 012/2018  had hemothorax & got treated  Hyperlipidemia    Reflux gastritis

## 2019-04-24 NOTE — PRE-ANESTHESIA EVALUATION ADULT - NSPREOPDXFT_GEN_ALL_CORE
K42.9, umbilical hernia without obstruction/gangrene K42.0, umbilical hernia with obstruction/gangrene

## 2019-04-24 NOTE — ASU DISCHARGE PLAN (ADULT/PEDIATRIC) - SHOWER ONLY DURATION DAY(S)
Please shower starting tomorrow. Remove top dressing prior to shower. You may shower with the steristrips in place. They will fall off on their own.

## 2019-04-24 NOTE — ASU DISCHARGE PLAN (ADULT/PEDIATRIC) - ASU DC SPECIAL INSTRUCTIONSFT
Please refer to the instructions in your handout. The information below is supplemental to the handout.     WOUND CARE:  You have a tegaderm (clear plastic bandage) placed over steristrips. The tegaderm can come off in 24 hours. Please leave the steristrips in place. They will fall off on their own. It is okay to shower with the strips in place. Please keep incisions clean and dry. Please do not Scrub or rub incisions. Do not use lotion or powder on incisions.   BATHING: Please do not submerge wound underwater. You may shower and/or sponge bathe.  ACTIVITY: No heavy lifting or straining (anything greater than 20 lbs) until your follow up appointment. If you are taking narcotic pain medication (such as Percocet) DO NOT drive a car, operate machinery or make important decisions.  DIET: Return to your usual diet.  NOTIFY YOUR SURGEON IF: You have any bleeding that does not stop, any pus draining from your wound(s), any fever (over 100.4 F) or chills, persistent nausea/vomiting, persistent diarrhea, or if your pain is not controlled on your discharge pain medications.  FOLLOW-UP: Please follow-up with your surgeon following discharge-please call the office to schedule an appointment.

## 2019-04-24 NOTE — ASU DISCHARGE PLAN (ADULT/PEDIATRIC) - NURSING INSTRUCTIONS
Next dose of Tylenol will be on or after ______1:22pm_____ ,today/tonight, If needed for pain/cramps. Your first dose of Tylenol was given at ____7:22am_______. Do not exceed more than 4000mg of Tylenol in one 24 hour setting.    Next dose of NSAIDS (Motrin/Alleve) will be on or after ___any time________ today if needed for pain/cramps.

## 2019-05-07 PROBLEM — K42.0 INCARCERATED UMBILICAL HERNIA: Status: RESOLVED | Noted: 2019-05-07 | Resolved: 2019-05-07

## 2019-05-08 ENCOUNTER — APPOINTMENT (OUTPATIENT)
Dept: SURGERY | Facility: CLINIC | Age: 64
End: 2019-05-08
Payer: COMMERCIAL

## 2019-05-08 VITALS
SYSTOLIC BLOOD PRESSURE: 141 MMHG | OXYGEN SATURATION: 98 % | RESPIRATION RATE: 16 BRPM | HEART RATE: 69 BPM | DIASTOLIC BLOOD PRESSURE: 87 MMHG | TEMPERATURE: 98.1 F

## 2019-05-08 DIAGNOSIS — Z01.818 ENCOUNTER FOR OTHER PREPROCEDURAL EXAMINATION: ICD-10-CM

## 2019-05-08 DIAGNOSIS — K42.0 UMBILICAL HERNIA WITH OBSTRUCTION, W/OUT GANGRENE: ICD-10-CM

## 2019-05-08 DIAGNOSIS — Z09 ENCOUNTER FOR FOLLOW-UP EXAMINATION AFTER COMPLETED TREATMENT FOR CONDITIONS OTHER THAN MALIGNANT NEOPLASM: ICD-10-CM

## 2019-05-08 PROCEDURE — 99024 POSTOP FOLLOW-UP VISIT: CPT

## 2019-05-08 NOTE — REASON FOR VISIT
[Post Op: _________] : a [unfilled] post op visit [FreeTextEntry1] : Reduction and repair of incarcerated umbilical hernia.

## 2019-07-25 ENCOUNTER — RX RENEWAL (OUTPATIENT)
Age: 64
End: 2019-07-25

## 2019-09-06 ENCOUNTER — TRANSCRIPTION ENCOUNTER (OUTPATIENT)
Age: 64
End: 2019-09-06

## 2019-09-12 ENCOUNTER — APPOINTMENT (OUTPATIENT)
Dept: PULMONOLOGY | Facility: CLINIC | Age: 64
End: 2019-09-12
Payer: COMMERCIAL

## 2019-09-12 VITALS
RESPIRATION RATE: 16 BRPM | HEIGHT: 68 IN | SYSTOLIC BLOOD PRESSURE: 150 MMHG | BODY MASS INDEX: 25.01 KG/M2 | WEIGHT: 165 LBS | DIASTOLIC BLOOD PRESSURE: 91 MMHG | HEART RATE: 62 BPM | OXYGEN SATURATION: 97 %

## 2019-09-12 LAB
DEPRECATED D DIMER PPP IA-ACNC: 500 NG/ML DDU
POCT - HEMOGLOBIN (HGB), QUANTITATIVE, TRANSCUTANEOUS: 13.8

## 2019-09-12 PROCEDURE — 99204 OFFICE O/P NEW MOD 45 MIN: CPT | Mod: 25

## 2019-09-12 PROCEDURE — 71046 X-RAY EXAM CHEST 2 VIEWS: CPT

## 2019-09-12 PROCEDURE — 94729 DIFFUSING CAPACITY: CPT

## 2019-09-12 PROCEDURE — 88738 HGB QUANT TRANSCUTANEOUS: CPT

## 2019-09-12 PROCEDURE — 94727 GAS DIL/WSHOT DETER LNG VOL: CPT

## 2019-09-12 PROCEDURE — 94060 EVALUATION OF WHEEZING: CPT

## 2019-09-12 PROCEDURE — 36415 COLL VENOUS BLD VENIPUNCTURE: CPT

## 2019-09-12 PROCEDURE — 95012 NITRIC OXIDE EXP GAS DETER: CPT

## 2019-09-12 RX ORDER — OMEGA-3S/DHA/EPA/FISH OIL 300-1000MG
CAPSULE ORAL
Refills: 0 | Status: DISCONTINUED | COMMUNITY
End: 2019-09-12

## 2019-09-12 RX ORDER — ASPIRIN 81 MG
81 TABLET, DELAYED RELEASE (ENTERIC COATED) ORAL
Refills: 0 | Status: DISCONTINUED | COMMUNITY
End: 2019-09-12

## 2019-09-12 RX ORDER — MUPIROCIN 20 MG/G
2 OINTMENT TOPICAL
Qty: 1 | Refills: 0 | Status: DISCONTINUED | COMMUNITY
Start: 2019-03-30 | End: 2019-09-12

## 2019-09-12 NOTE — REVIEW OF SYSTEMS
[Postnasal Drip] : postnasal drip [Nasal Congestion] : nasal congestion [As Noted in HPI] : as noted in HPI [Hay Fever] : hay fever [Negative] : Gastrointestinal

## 2019-09-12 NOTE — ADDENDUM
[FreeTextEntry1] : ddimer elevated at 500, spoke with pt, moderate concern for PE given recent travel and dyspnea.  he does not want to go to ED to have scan done, will expedite insurance approval for outpt cta.

## 2019-09-12 NOTE — HISTORY OF PRESENT ILLNESS
[FreeTextEntry1] : 63M \par June/July persistent cough, got better, august again returned.  Cough is productive, no blood.  No associated illness/cold.  No obvious environmental trigger such as dust/smoke.  Suffers from seasonal allergies and post nasal drip.  Recently has noticed gets sob when talking.  Has traveled a few times this summer, once to [place], drove to [place].  Denies leg swelling or chest pain/palpitations.\par \par also snores, told he has mild SERA in the past.  Has woken up gasping/choking in the past.  Can fall asleep easily at night, no particular daytime sleepiness.  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

## 2019-09-12 NOTE — PROCEDURE
[FreeTextEntry1] : CXR: clear lungs, no focal consolidation or pleural effusions, cardiac silhouette appears normal.  No bony abnormality.\par \par PFT: Normal spirometry without a significant bronchodilator response.  Lung volumes normal. DLCO normal.\par \par Exhaled nitric oxide: 22, normal\par \par

## 2019-09-12 NOTE — ASSESSMENT
[FreeTextEntry1] : snoring/hx of humberto--will evaluate with home sleep study\par \par cough--likely related to PND/allergies, suggest trial of flonase/claritin d\par \par dyspnea--check ddimer to r/o PE given recent frequent travel long distances\par \par follow up after sleep study

## 2019-09-12 NOTE — PHYSICAL EXAM
[Well Groomed] : well groomed [General Appearance - In No Acute Distress] : no acute distress [Erythema] : erythema of the pharynx [Neck Appearance] : the appearance of the neck was normal [Heart Sounds] : normal S1 and S2 [Respiration, Rhythm And Depth] : normal respiratory rhythm and effort [] : no respiratory distress [Exaggerated Use Of Accessory Muscles For Inspiration] : no accessory muscle use [Auscultation Breath Sounds / Voice Sounds] : lungs were clear to auscultation bilaterally [Nail Clubbing] : no clubbing of the fingernails [Cyanosis, Localized] : no localized cyanosis

## 2019-09-17 ENCOUNTER — APPOINTMENT (OUTPATIENT)
Dept: PULMONOLOGY | Facility: CLINIC | Age: 64
End: 2019-09-17
Payer: COMMERCIAL

## 2019-09-17 PROCEDURE — 95800 SLP STDY UNATTENDED: CPT

## 2019-09-18 PROCEDURE — 95800 SLP STDY UNATTENDED: CPT

## 2019-10-03 ENCOUNTER — APPOINTMENT (OUTPATIENT)
Dept: PULMONOLOGY | Facility: CLINIC | Age: 64
End: 2019-10-03
Payer: COMMERCIAL

## 2019-10-03 VITALS
HEART RATE: 63 BPM | OXYGEN SATURATION: 97 % | TEMPERATURE: 99.2 F | DIASTOLIC BLOOD PRESSURE: 89 MMHG | SYSTOLIC BLOOD PRESSURE: 149 MMHG | RESPIRATION RATE: 12 BRPM

## 2019-10-03 PROCEDURE — 99214 OFFICE O/P EST MOD 30 MIN: CPT

## 2019-10-03 NOTE — ASSESSMENT
[FreeTextEntry1] : cough improved with flonase, cont\par \par SERA, will initial auto cpap and mask fitting now, pt instructed to follow up after one month of use.  discussed desensitization techniques if he is uncomfortable with it at first.

## 2019-10-03 NOTE — PROCEDURE
[FreeTextEntry1] : CT chest 9/13/19 reviewed: no PE, no active pulmonary disease\par \par HSS reviewed, moderate SERA, ahi 25

## 2019-11-12 ENCOUNTER — RX RENEWAL (OUTPATIENT)
Age: 64
End: 2019-11-12

## 2020-02-07 ENCOUNTER — RX RENEWAL (OUTPATIENT)
Age: 65
End: 2020-02-07

## 2020-02-27 ENCOUNTER — APPOINTMENT (OUTPATIENT)
Dept: INTERNAL MEDICINE | Facility: CLINIC | Age: 65
End: 2020-02-27
Payer: COMMERCIAL

## 2020-02-27 ENCOUNTER — NON-APPOINTMENT (OUTPATIENT)
Age: 65
End: 2020-02-27

## 2020-02-27 VITALS
HEART RATE: 61 BPM | WEIGHT: 163.2 LBS | OXYGEN SATURATION: 99 % | BODY MASS INDEX: 25.62 KG/M2 | DIASTOLIC BLOOD PRESSURE: 74 MMHG | SYSTOLIC BLOOD PRESSURE: 138 MMHG | HEIGHT: 67 IN | TEMPERATURE: 98.4 F

## 2020-02-27 DIAGNOSIS — Z23 ENCOUNTER FOR IMMUNIZATION: ICD-10-CM

## 2020-02-27 DIAGNOSIS — Z12.5 ENCOUNTER FOR SCREENING FOR MALIGNANT NEOPLASM OF PROSTATE: ICD-10-CM

## 2020-02-27 PROCEDURE — 99397 PER PM REEVAL EST PAT 65+ YR: CPT | Mod: 25

## 2020-02-27 PROCEDURE — G0444 DEPRESSION SCREEN ANNUAL: CPT

## 2020-02-27 PROCEDURE — 36415 COLL VENOUS BLD VENIPUNCTURE: CPT

## 2020-02-27 PROCEDURE — 93000 ELECTROCARDIOGRAM COMPLETE: CPT | Mod: 59

## 2020-02-27 NOTE — HISTORY OF PRESENT ILLNESS
Per there was no changes to dosage, sig or quantity.  The medication(s) are set up as pending and waiting for your approval.  Preferred pharmacy was set up and verified.    [facility]  [address]  [id number]  [phone]  Fax [phone]   [FreeTextEntry1] : \par 65 y/o man presents for annual exam. he feels well w no new concerns. \par \par s/p umbilical hernia repair w no complications, doing well \par \par hx of Barretts esophagus s/p high freq ablation, follows w Dr Joaquin GI. had EGD, colonoscopy done 2019, no abnormalities as per pt. continues on high dose PPI. \par \par hx of familial hyperlipidemia, on high dose rosuvastatin ,ezetimibe w moderate control of lipids\par \par moderate SERA and using CPAP intermittently, recent pulm consult noted, CT angiogram w no evidence of PE\par \par he would like to see new ENT for his Menieres of R ear

## 2020-02-27 NOTE — ASSESSMENT
[FreeTextEntry1] : discussed w pt \par \par check routine labs as below \par \par cont current rx \par \par cardiology eval in 2018 reviewed \par \par he reports colonoscopy normal and EGD in 2019, pt will have record sent \par \par cont current rx, diet control, monitor lipids \par \par ortho f/u as scheduled \par \par GI f/u as scheduled for EGD surveillance for Barretts, cont PPI \par \par he had influenza vaccine this season. consider Shingrix vaccine . 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

## 2020-02-27 NOTE — PHYSICAL EXAM
[No Acute Distress] : no acute distress [Well Nourished] : well nourished [Well Developed] : well developed [Well-Appearing] : well-appearing [Normal Voice/Communication] : normal voice/communication [Normal Sclera/Conjunctiva] : normal sclera/conjunctiva [PERRL] : pupils equal round and reactive to light [Normal Outer Ear/Nose] : the outer ears and nose were normal in appearance [Normal Oropharynx] : the oropharynx was normal [Normal TMs] : both tympanic membranes were normal [No JVD] : no jugular venous distention [Supple] : supple [No Lymphadenopathy] : no lymphadenopathy [Thyroid Normal, No Nodules] : the thyroid was normal and there were no nodules present [No Respiratory Distress] : no respiratory distress  [Clear to Auscultation] : lungs were clear to auscultation bilaterally [No Accessory Muscle Use] : no accessory muscle use [Normal Rate] : normal rate  [Regular Rhythm] : with a regular rhythm [Normal S1, S2] : normal S1 and S2 [No Murmur] : no murmur heard [No Carotid Bruits] : no carotid bruits [No Abdominal Bruit] : a ~M bruit was not heard ~T in the abdomen [No Varicosities] : no varicosities [Pedal Pulses Present] : the pedal pulses are present [No Edema] : there was no peripheral edema [No Extremity Clubbing/Cyanosis] : no extremity clubbing/cyanosis [Non Tender] : non-tender [Soft] : abdomen soft [Non-distended] : non-distended [No HSM] : no HSM [No Masses] : no abdominal mass palpated [Normal Bowel Sounds] : normal bowel sounds [Normal Sphincter Tone] : normal sphincter tone [No Mass] : no mass [Prostate Enlargement] : the prostate was not enlarged [Prostate Tenderness] : the prostate was not tender [No Prostate Nodules] : no prostate nodules [Normal Supraclavicular Nodes] : no supraclavicular lymphadenopathy [Normal Anterior Cervical Nodes] : no anterior cervical lymphadenopathy [Normal Posterior Cervical Nodes] : no posterior cervical lymphadenopathy [No Spinal Tenderness] : no spinal tenderness [No Joint Swelling] : no joint swelling [No Rash] : no rash [Grossly Normal Strength/Tone] : grossly normal strength/tone [Normal Gait] : normal gait [Coordination Grossly Intact] : coordination grossly intact [No Focal Deficits] : no focal deficits [Speech Grossly Normal] : speech grossly normal [Normal Affect] : the affect was normal [Normal Mood] : the mood was normal [Normal Insight/Judgement] : insight and judgment were intact [Alert and Oriented x3] : oriented to person, place, and time

## 2020-02-27 NOTE — HEALTH RISK ASSESSMENT
[0] : 2) Feeling down, depressed, or hopeless: Not at all (0) [Patient reported colonoscopy was normal] : Patient reported colonoscopy was normal [Hepatitis C test offered] : Hepatitis C test offered [Employed] : employed [None] : None [] :  [] : No [No] : No [No falls in past year] : Patient reported no falls in the past year [Fully functional (bathing, dressing, toileting, transferring, walking, feeding)] : Fully functional (bathing, dressing, toileting, transferring, walking, feeding) [Fully functional (using the telephone, shopping, preparing meals, housekeeping, doing laundry, using] : Fully functional and needs no help or supervision to perform IADLs (using the telephone, shopping, preparing meals, housekeeping, doing laundry, using transportation, managing medications and managing finances) [ColonoscopyDate] : 08/18

## 2020-02-28 ENCOUNTER — MED ADMIN CHARGE (OUTPATIENT)
Age: 65
End: 2020-02-28

## 2020-04-02 LAB
ALBUMIN SERPL ELPH-MCNC: 4.8 G/DL
ALP BLD-CCNC: 48 U/L
ALT SERPL-CCNC: 26 U/L
ANION GAP SERPL CALC-SCNC: 12 MMOL/L
APPEARANCE: CLEAR
AST SERPL-CCNC: 22 U/L
BASOPHILS # BLD AUTO: 0.07 K/UL
BASOPHILS NFR BLD AUTO: 1.5 %
BILIRUB SERPL-MCNC: 0.5 MG/DL
BILIRUBIN URINE: NEGATIVE
BLOOD URINE: NEGATIVE
BUN SERPL-MCNC: 11 MG/DL
CALCIUM SERPL-MCNC: 9.9 MG/DL
CHLORIDE SERPL-SCNC: 101 MMOL/L
CHOLEST SERPL-MCNC: 237 MG/DL
CHOLEST/HDLC SERPL: 3.2 RATIO
CO2 SERPL-SCNC: 25 MMOL/L
COLOR: COLORLESS
CREAT SERPL-MCNC: 0.85 MG/DL
EOSINOPHIL # BLD AUTO: 0.38 K/UL
EOSINOPHIL NFR BLD AUTO: 8.2 %
ESTIMATED AVERAGE GLUCOSE: 117 MG/DL
GLUCOSE QUALITATIVE U: NEGATIVE
GLUCOSE SERPL-MCNC: 96 MG/DL
HBA1C MFR BLD HPLC: 5.7 %
HCT VFR BLD CALC: 44.5 %
HDLC SERPL-MCNC: 74 MG/DL
HGB BLD-MCNC: 14 G/DL
IMM GRANULOCYTES NFR BLD AUTO: 0.2 %
KETONES URINE: NEGATIVE
LDLC SERPL CALC-MCNC: 145 MG/DL
LEUKOCYTE ESTERASE URINE: NEGATIVE
LYMPHOCYTES # BLD AUTO: 1.5 K/UL
LYMPHOCYTES NFR BLD AUTO: 32.4 %
MAN DIFF?: NORMAL
MCHC RBC-ENTMCNC: 30 PG
MCHC RBC-ENTMCNC: 31.5 GM/DL
MCV RBC AUTO: 95.3 FL
MONOCYTES # BLD AUTO: 0.37 K/UL
MONOCYTES NFR BLD AUTO: 8 %
NEUTROPHILS # BLD AUTO: 2.3 K/UL
NEUTROPHILS NFR BLD AUTO: 49.7 %
NITRITE URINE: NEGATIVE
PH URINE: 7
PLATELET # BLD AUTO: 276 K/UL
POTASSIUM SERPL-SCNC: 4.8 MMOL/L
PROT SERPL-MCNC: 7.4 G/DL
PROTEIN URINE: NEGATIVE
PSA SERPL-MCNC: 2.86 NG/ML
RBC # BLD: 4.67 M/UL
RBC # FLD: 14.9 %
SODIUM SERPL-SCNC: 138 MMOL/L
SPECIFIC GRAVITY URINE: 1
T4 FREE SERPL-MCNC: 1.3 NG/DL
TRIGL SERPL-MCNC: 93 MG/DL
TSH SERPL-ACNC: 1.67 UIU/ML
UROBILINOGEN URINE: NORMAL
WBC # FLD AUTO: 4.63 K/UL

## 2020-05-08 ENCOUNTER — RX RENEWAL (OUTPATIENT)
Age: 65
End: 2020-05-08

## 2020-05-13 NOTE — ED PROVIDER NOTE - MEDICAL DECISION MAKING DETAILS
<<----- Click to add NO significant Past Surgical History
Fall, multiple rib fx, lung contusion, pneumo, plan: analgesia, surgery consult, likely admission.

## 2020-08-09 ENCOUNTER — RX RENEWAL (OUTPATIENT)
Age: 65
End: 2020-08-09

## 2020-09-11 ENCOUNTER — RX RENEWAL (OUTPATIENT)
Age: 65
End: 2020-09-11

## 2020-11-13 ENCOUNTER — RX RENEWAL (OUTPATIENT)
Age: 65
End: 2020-11-13

## 2021-03-11 NOTE — DISCHARGE NOTE ADULT - PHYSICIAN SECTION COMPLETE
Patient Instructions      Delivery Plan      I NEED A [place]       Take me to: [facility]  Phone number: [phone]     My name is: Lucia Covington  : 2000     My Doctor is: Linda Tafoya MD   Attending Physician: [facility]  Prenatal care was at [facility] [phone].     20 year old                                                                                -para:   Estimated Date of Delivery: Mar 20, 2021  At 37w2d on 2021  Delivery type: Vaginal Delivery         Patient Active Problem List   Diagnosis     Supervision of high risk pregnancy, antepartum     Language barrier, cultural differences      Allergies:No Known Allergies     Lab Results:  Blood Type: O POS  GBS:negative Date completed: 21           Rubella IgG   Date/Time Value Ref Range Status   08/10/2020 10:00 AM Positive   Final            HIV Antigen/Antibody   Date/Time Value Ref Range Status   08/10/2020 10:00 AM Negative Negative Final      No results found for: N5UV        Hepatitis B Surface Antigen   Date/Time Value Ref Range Status   08/10/2020 10:00 AM Negative Negative Final            Hemoglobin   Date Value Ref Range Status   2020 11.9 11.7 - 15.7 g/dL Final   08/10/2020 13.0 11.7 - 15.7 g/dL Final         Last cervical check: 2021 Cervical Dilation: Deferred. Vertex confirmed on US on 3/1/21.           Immunization History   Administered Date(s) Administered     HEPA 10/14/2015, 2016     HPV 2012, 2013, 2014     HPV Quadrivalent 2012, 2013     Hep B, Peds or Adolescent 10/01/2012, 2013     HepA-Adult 10/14/2015     HepA-ped 2 Dose 2016     HepB 2012, 10/01/2012, 2013     HepB, Unspecified 2012     Influenza (IIV3) PF 10/23/2012, 10/17/2013     Influenza Intranasal Vaccine 10/23/2012     Influenza Intranasal Vaccine 4 valent 10/17/2013     Influenza Vaccine  IM > 6 months Valent IIV4 09/23/2020     Influenza Vaccine, 6+MO IM (QUADRIVALENT W/PRESERVATIVES) 11/26/2014, 10/14/2015, 11/04/2016, 11/08/2017, 10/19/2018, 10/18/2019     MMR 05/22/2012, 08/31/2012, 10/23/2012     MMR/V 10/23/2012     Meningococcal (Menactra ) 10/23/2012, 10/14/2015     Meningococcal (Menomune ) 10/23/2012     Poliovirus, inactivated (IPV) 10/01/2012, 11/23/2012, 06/12/2013     Td (Adult), Adsorbed 05/22/2012     Tdap (Adacel,Boostrix) 05/22/2012, 10/01/2012, 06/12/2013, 01/07/2021     Varicella 10/01/2012, 10/23/2012, 10/14/2015            Immunizations needed postpartum: None     Who will be present at the delivery? Depends on days and time: Father of baby or sister. Maria Preston. Sister's name: Lukas Angel.      Do you have a ?No If no, would you like one?      What are your plans for pain control? Natural     Who will cut the umbilical cord? Optional      Do you plan to breastfeed? Yes     If your baby is a boy, would you like him circumcised?N/A     Name of baby's clinic: [facility]     Would you like your baby to have the recommended vitamin K shot to prevent bleeding, Hepatitis B shot, and eye ointment to prevent eye infections?Yes           Next Appointment is: 1 week       Yes

## 2021-03-22 ENCOUNTER — RX RENEWAL (OUTPATIENT)
Age: 66
End: 2021-03-22

## 2021-05-03 ENCOUNTER — APPOINTMENT (OUTPATIENT)
Dept: INTERNAL MEDICINE | Facility: CLINIC | Age: 66
End: 2021-05-03
Payer: MEDICARE

## 2021-05-03 ENCOUNTER — NON-APPOINTMENT (OUTPATIENT)
Age: 66
End: 2021-05-03

## 2021-05-03 VITALS
OXYGEN SATURATION: 98 % | TEMPERATURE: 97.8 F | HEIGHT: 67 IN | WEIGHT: 158 LBS | DIASTOLIC BLOOD PRESSURE: 70 MMHG | BODY MASS INDEX: 24.8 KG/M2 | SYSTOLIC BLOOD PRESSURE: 122 MMHG | HEART RATE: 57 BPM

## 2021-05-03 DIAGNOSIS — Z87.09 PERSONAL HISTORY OF OTHER DISEASES OF THE RESPIRATORY SYSTEM: ICD-10-CM

## 2021-05-03 DIAGNOSIS — K21.9 GASTRO-ESOPHAGEAL REFLUX DISEASE W/OUT ESOPHAGITIS: ICD-10-CM

## 2021-05-03 PROCEDURE — G0402 INITIAL PREVENTIVE EXAM: CPT

## 2021-05-03 PROCEDURE — G0009: CPT

## 2021-05-03 PROCEDURE — 90732 PPSV23 VACC 2 YRS+ SUBQ/IM: CPT

## 2021-05-03 PROCEDURE — G0403: CPT | Mod: 59

## 2021-05-03 PROCEDURE — 36415 COLL VENOUS BLD VENIPUNCTURE: CPT

## 2021-05-03 RX ORDER — EPINEPHRINE 0.3 MG/.3ML
0.3 INJECTION INTRAMUSCULAR
Qty: 1 | Refills: 1 | Status: ACTIVE | COMMUNITY
Start: 2019-01-22 | End: 1900-01-01

## 2021-05-03 NOTE — PHYSICAL EXAM
[No Acute Distress] : no acute distress [Well Nourished] : well nourished [Well Developed] : well developed [Well-Appearing] : well-appearing [Normal Voice/Communication] : normal voice/communication [Normal Sclera/Conjunctiva] : normal sclera/conjunctiva [PERRL] : pupils equal round and reactive to light [Normal Outer Ear/Nose] : the outer ears and nose were normal in appearance [Normal Oropharynx] : the oropharynx was normal [Normal TMs] : both tympanic membranes were normal [No JVD] : no jugular venous distention [Supple] : supple [No Lymphadenopathy] : no lymphadenopathy [Thyroid Normal, No Nodules] : the thyroid was normal and there were no nodules present [No Respiratory Distress] : no respiratory distress  [Clear to Auscultation] : lungs were clear to auscultation bilaterally [No Accessory Muscle Use] : no accessory muscle use [Normal Rate] : normal rate  [Regular Rhythm] : with a regular rhythm [Normal S1, S2] : normal S1 and S2 [No Murmur] : no murmur heard [No Carotid Bruits] : no carotid bruits [No Abdominal Bruit] : a ~M bruit was not heard ~T in the abdomen [No Varicosities] : no varicosities [Pedal Pulses Present] : the pedal pulses are present [No Edema] : there was no peripheral edema [No Extremity Clubbing/Cyanosis] : no extremity clubbing/cyanosis [Soft] : abdomen soft [Non Tender] : non-tender [Non-distended] : non-distended [No Masses] : no abdominal mass palpated [No HSM] : no HSM [Normal Bowel Sounds] : normal bowel sounds [Normal Sphincter Tone] : normal sphincter tone [No Mass] : no mass [Prostate Enlargement] : the prostate was not enlarged [Prostate Tenderness] : the prostate was not tender [No Prostate Nodules] : no prostate nodules [Normal Supraclavicular Nodes] : no supraclavicular lymphadenopathy [Normal Posterior Cervical Nodes] : no posterior cervical lymphadenopathy [Normal Anterior Cervical Nodes] : no anterior cervical lymphadenopathy [No Spinal Tenderness] : no spinal tenderness [No Joint Swelling] : no joint swelling [Grossly Normal Strength/Tone] : grossly normal strength/tone [No Rash] : no rash [Normal Gait] : normal gait [Coordination Grossly Intact] : coordination grossly intact [No Focal Deficits] : no focal deficits [Speech Grossly Normal] : speech grossly normal [Normal Affect] : the affect was normal [Alert and Oriented x3] : oriented to person, place, and time [Normal Mood] : the mood was normal [Normal Insight/Judgement] : insight and judgment were intact [FreeTextEntry1] : external hemorroid

## 2021-05-03 NOTE — HEALTH RISK ASSESSMENT
[0] : 2) Feeling down, depressed, or hopeless: Not at all (0) [Patient reported colonoscopy was normal] : Patient reported colonoscopy was normal [Hepatitis C test offered] : Hepatitis C test offered [None] : None [Employed] : employed [] :  [Fully functional (bathing, dressing, toileting, transferring, walking, feeding)] : Fully functional (bathing, dressing, toileting, transferring, walking, feeding) [Fully functional (using the telephone, shopping, preparing meals, housekeeping, doing laundry, using] : Fully functional and needs no help or supervision to perform IADLs (using the telephone, shopping, preparing meals, housekeeping, doing laundry, using transportation, managing medications and managing finances) [No] : In the past 12 months have you used drugs other than those required for medical reasons? No [] : No [ColonoscopyDate] : 08/18

## 2021-05-03 NOTE — ASSESSMENT
[FreeTextEntry1] : discussed w pt \par \par check routine labs as below \par \par cont current rx \par \par he reports colonoscopy normal and EGD in 2019, will obtain report from Dr Joaquin at [place] \par \par cont current rx, diet control, monitor lipids \par \par GI f/u as scheduled for EGD surveillance for Barretts, cont PPI \par \par reviewed vaccinations. COVID vaccines completed. discussed Pneumovax administered today. consider Shingrix vaccine \par \par cont derm f/u as scheduled for surveillance \par \par advised to use his CPAP for SERA regularly \par \par RTO 6 months for routine f/u or earlier prn if any new concerns

## 2021-05-03 NOTE — HISTORY OF PRESENT ILLNESS
[FreeTextEntry1] : \par 66 y/o man presents for annual exam. he feels well w no new concerns. no illnesses during COVID pandemic. he had COVID vaccine x 2 doses , Pfizer. \par \par hx of Barretts esophagus s/p high freq ablation, follows w Dr Joaquin GI at [place]. had EGD, colonoscopy done 2019, no abnormalities as per pt. continues on high dose PPI. \par \par hx of familial hyperlipidemia, on high dose rosuvastatin ,ezetimibe w moderate control of lipids\par \par moderate SERA and using CPAP intermittently\par \par sees his dermatologist at least yearly , has appt soon

## 2021-05-24 LAB
ALBUMIN SERPL ELPH-MCNC: 4.7 G/DL
ALP BLD-CCNC: 46 U/L
ALT SERPL-CCNC: 25 U/L
ANION GAP SERPL CALC-SCNC: 11 MMOL/L
APPEARANCE: CLEAR
AST SERPL-CCNC: 22 U/L
BASOPHILS # BLD AUTO: 0.05 K/UL
BASOPHILS NFR BLD AUTO: 1.1 %
BILIRUB SERPL-MCNC: 0.4 MG/DL
BILIRUBIN URINE: NEGATIVE
BLOOD URINE: NEGATIVE
BUN SERPL-MCNC: 13 MG/DL
CALCIUM SERPL-MCNC: 9.9 MG/DL
CHLORIDE SERPL-SCNC: 104 MMOL/L
CHOLEST SERPL-MCNC: 210 MG/DL
CO2 SERPL-SCNC: 24 MMOL/L
COLOR: NORMAL
CREAT SERPL-MCNC: 0.83 MG/DL
EOSINOPHIL # BLD AUTO: 0.49 K/UL
EOSINOPHIL NFR BLD AUTO: 11 %
ESTIMATED AVERAGE GLUCOSE: 114 MG/DL
GLUCOSE QUALITATIVE U: NEGATIVE
GLUCOSE SERPL-MCNC: 99 MG/DL
HBA1C MFR BLD HPLC: 5.6 %
HCT VFR BLD CALC: 46.1 %
HDLC SERPL-MCNC: 60 MG/DL
HGB BLD-MCNC: 14.8 G/DL
IMM GRANULOCYTES NFR BLD AUTO: 0.2 %
KETONES URINE: NEGATIVE
LDLC SERPL CALC-MCNC: 126 MG/DL
LEUKOCYTE ESTERASE URINE: NEGATIVE
LYMPHOCYTES # BLD AUTO: 1.48 K/UL
LYMPHOCYTES NFR BLD AUTO: 33.2 %
MAN DIFF?: NORMAL
MCHC RBC-ENTMCNC: 32.1 GM/DL
MCHC RBC-ENTMCNC: 32.7 PG
MCV RBC AUTO: 101.8 FL
MONOCYTES # BLD AUTO: 0.45 K/UL
MONOCYTES NFR BLD AUTO: 10.1 %
NEUTROPHILS # BLD AUTO: 1.98 K/UL
NEUTROPHILS NFR BLD AUTO: 44.4 %
NITRITE URINE: NEGATIVE
NONHDLC SERPL-MCNC: 150 MG/DL
PH URINE: 7
PLATELET # BLD AUTO: 234 K/UL
POTASSIUM SERPL-SCNC: 4.9 MMOL/L
PROT SERPL-MCNC: 7.5 G/DL
PROTEIN URINE: NEGATIVE
PSA SERPL-MCNC: 3.43 NG/ML
RBC # BLD: 4.53 M/UL
RBC # FLD: 13 %
SODIUM SERPL-SCNC: 138 MMOL/L
SPECIFIC GRAVITY URINE: 1.01
T4 FREE SERPL-MCNC: 1 NG/DL
TRIGL SERPL-MCNC: 120 MG/DL
TSH SERPL-ACNC: 1.69 UIU/ML
UROBILINOGEN URINE: NORMAL
WBC # FLD AUTO: 4.46 K/UL

## 2021-05-27 ENCOUNTER — APPOINTMENT (OUTPATIENT)
Dept: OTOLARYNGOLOGY | Facility: CLINIC | Age: 66
End: 2021-05-27
Payer: MEDICARE

## 2021-05-27 VITALS
HEIGHT: 67 IN | WEIGHT: 158 LBS | HEART RATE: 57 BPM | TEMPERATURE: 97.7 F | BODY MASS INDEX: 24.8 KG/M2 | DIASTOLIC BLOOD PRESSURE: 82 MMHG | SYSTOLIC BLOOD PRESSURE: 128 MMHG

## 2021-05-27 DIAGNOSIS — R06.83 SNORING: ICD-10-CM

## 2021-05-27 DIAGNOSIS — H92.01 OTALGIA, RIGHT EAR: ICD-10-CM

## 2021-05-27 PROCEDURE — 99203 OFFICE O/P NEW LOW 30 MIN: CPT | Mod: 25

## 2021-05-27 PROCEDURE — 69210 REMOVE IMPACTED EAR WAX UNI: CPT

## 2021-05-27 NOTE — END OF VISIT
[FreeTextEntry3] : .ayy\par  [Time Spent: ___ minutes] : I have spent [unfilled] minutes of time on the encounter.

## 2021-05-27 NOTE — ASSESSMENT
[FreeTextEntry1] : Patient with hx of right Menieres Syndrome, hearing loss and septoplasty years ago complains of right sided ear tenderness \par \par Wax:\par -Cerumen is removed from the right and left  ear canal with a curette and suction.\par -Rx:Debrox be placed in both ears on a routine basis to keep them free of wax.\par -Routine debridement suggested to keep the ears free of wax.\par \par r/o Asymmetrical SNHL:\par -felice arrange audiogram\par may need HAE\par poss MRI of IAC's\par \par Ear apin-TMJ\par rec DDS eval\par \par f/u after audio

## 2021-05-27 NOTE — PHYSICAL EXAM
[Midline] : trachea located in midline position [Normal] : no rashes [de-identified] : tenderness [Hearing Loss Right Only] : normal [Hearing Loss Left Only] : normal [Nystagmus] : ~T no ~M nystagmus was seen [Fukuda Step Test] : Fukuda Step Test was Negative [Conrado-Hallmartake] : [place]-Hallpike: Negative [FreeTextEntry8] : wax [FreeTextEntry9] : wax [de-identified] : wnl

## 2021-05-27 NOTE — HISTORY OF PRESENT ILLNESS
[SMR] : submucous resection (SMR) [No] : patient does not have a  history of radiation therapy [de-identified] : 66 yo male\par Patient with hx of right  Menieres Syndrome and hearing loss in the right ear complains of right sided ear tenderness at night. States its intermittent and only happens at night, once hes out of bed pain is gone. Pt has no ear pain, ear drainage, hearing loss, tinnitus, vertigo, nasal congestion, nasal discharge, epistaxis, sinus infections, facial pain, facial pressure, throat pain, dysphagia or fevers\par  nasal congestion-seasonal\par  [Vertigo] : no vertigo [Dizziness] : no dizziness [Hearing Loss] : hearing loss [Otalgia] : otalgia [Meniere Disease] : Meniere disease [Early Onset Hearing Loss] : no early onset hearing loss [Stroke] : no stroke [Nasal Congestion] : nasal congestion [Allergic Rhinitis] : allergic rhinitis [Environmental Allergies] : environmental allergies [Environmental Allergens] : environmental allergens [Adenoidectomy] : no adenoidectomy [Obstructive Sleep Apnea] : Obstructive Sleep Apnea [Snoring] : Snoring [Hyperthyroidism] : no hyperthyroidism [Sialadenitis] : no sialadenitis [Hodgkin Disease] : no hodgkin disease [Non-Hodgkin Lymphoma] : no non-hodgkin lymphoma [None] : No risk factors have been identified. [Graves Disease] : no graves disease [Thyroid Cancer] : no thyroid cancer

## 2021-07-01 ENCOUNTER — APPOINTMENT (OUTPATIENT)
Dept: OTOLARYNGOLOGY | Facility: CLINIC | Age: 66
End: 2021-07-01
Payer: MEDICARE

## 2021-07-01 VITALS
TEMPERATURE: 97.6 F | WEIGHT: 158 LBS | SYSTOLIC BLOOD PRESSURE: 130 MMHG | HEART RATE: 63 BPM | HEIGHT: 67 IN | BODY MASS INDEX: 24.8 KG/M2 | DIASTOLIC BLOOD PRESSURE: 82 MMHG

## 2021-07-01 DIAGNOSIS — H61.23 IMPACTED CERUMEN, BILATERAL: ICD-10-CM

## 2021-07-01 DIAGNOSIS — H90.3 SENSORINEURAL HEARING LOSS, BILATERAL: ICD-10-CM

## 2021-07-01 DIAGNOSIS — H81.01 MENIERE'S DISEASE, RIGHT EAR: ICD-10-CM

## 2021-07-01 DIAGNOSIS — H90.5 UNSPECIFIED SENSORINEURAL HEARING LOSS: ICD-10-CM

## 2021-07-01 DIAGNOSIS — K21.9 GASTRO-ESOPHAGEAL REFLUX DISEASE W/OUT ESOPHAGITIS: ICD-10-CM

## 2021-07-01 PROCEDURE — 69210 REMOVE IMPACTED EAR WAX UNI: CPT

## 2021-07-01 PROCEDURE — 92557 COMPREHENSIVE HEARING TEST: CPT

## 2021-07-01 PROCEDURE — 99213 OFFICE O/P EST LOW 20 MIN: CPT | Mod: 25

## 2021-07-01 PROCEDURE — 92567 TYMPANOMETRY: CPT

## 2021-07-01 NOTE — PHYSICAL EXAM
[de-identified] : tenderness [Midline] : trachea located in midline position [Hearing Loss Right Only] : normal [Hearing Loss Left Only] : normal [Normal] : gait was normal [Nystagmus] : ~T no ~M nystagmus was seen [Fukuda Step Test] : Fukuda Step Test was Negative [Conrado-Hallmartake] : [place]-Hallpike: Negative [FreeTextEntry8] : wax [FreeTextEntry9] : wax [de-identified] : wnl

## 2021-07-01 NOTE — HISTORY OF PRESENT ILLNESS
[SMR] : submucous resection (SMR) [No] : patient does not have a  history of radiation therapy [de-identified] : 66 yo male\par Patient with hx of right  Menieres Syndrome and hearing loss in the right ear complains of right sided ear tenderness at night. States its intermittent and only happens at night, once hes out of bed pain is gone. Pt has no ear pain, ear drainage, hearing loss, tinnitus, vertigo, nasal congestion, nasal discharge, epistaxis, sinus infections, facial pain, facial pressure, throat pain, dysphagia or fevers\par  nasal congestion-seasonal\par  [Vertigo] : no vertigo [Dizziness] : no dizziness [Hearing Loss] : hearing loss [Otalgia] : otalgia [Meniere Disease] : Meniere disease [Early Onset Hearing Loss] : no early onset hearing loss [Stroke] : no stroke [Nasal Congestion] : nasal congestion [Allergic Rhinitis] : allergic rhinitis [Environmental Allergies] : environmental allergies [Environmental Allergens] : environmental allergens [Adenoidectomy] : no adenoidectomy [Obstructive Sleep Apnea] : Obstructive Sleep Apnea [Snoring] : Snoring [Hyperthyroidism] : no hyperthyroidism [Sialadenitis] : no sialadenitis [Hodgkin Disease] : no hodgkin disease [Non-Hodgkin Lymphoma] : no non-hodgkin lymphoma [None] : No risk factors have been identified. [Graves Disease] : no graves disease [Thyroid Cancer] : no thyroid cancer

## 2021-07-01 NOTE — ASSESSMENT
[FreeTextEntry1] : Patient with hx of right Menieres Syndrome, hearing loss and septoplasty years ago complains of right sided ear tenderness \par \par Wax:\par -Cerumen is removed from the right and left  ear canal with a curette and suction.\par -Rx:Debrox be placed in both ears on a routine basis to keep them free of wax.\par -Routine debridement suggested to keep the ears free of wax.\par \par Right  Asymmetrical SNHLrx- MRI of IAC's\par \par Ear pain-TMJ\par rec DDS eval\par \par f/u after MRI to r/o AN

## 2021-07-01 NOTE — DATA REVIEWED
[de-identified] : Hearing Test performed to evaluate the extent of hearing loss and  to explain pt's symptoms\par today's hearing test was personally reviewed and revealed\par RT: Mild SNHL 250-1kHz rising to essentially WNL through 8kHz\par LT: -4kHz, mild HL 6k-8kHz.

## 2021-07-01 NOTE — END OF VISIT
[FreeTextEntry3] : I personally saw and examined MICHELLE GUEVARA in detail. I spoke to VERA Martinez regarding the assessment and plan of care. I reviewed the above assessment and plan of care, and agree. I have made changes in changes in the body of the note where appropriate.I personally reviewed the HPI, PMH, FH, SH, ROS and medications/allergies. I have spoken to VERA Martinez regarding the history and have personally determined the assessment and plan of care, and documented this myself. I was present and participated in all key portions of the encounter and all procedures noted above. I have made changes in the body of the note where appropriate.\par \par Attesting Faculty: See Attending Signature Below \par \par \par \par  [Time Spent: ___ minutes] : I have spent [unfilled] minutes of time on the encounter.

## 2021-07-15 ENCOUNTER — NON-APPOINTMENT (OUTPATIENT)
Age: 66
End: 2021-07-15

## 2021-09-16 ENCOUNTER — APPOINTMENT (OUTPATIENT)
Dept: ORTHOPEDIC SURGERY | Facility: CLINIC | Age: 66
End: 2021-09-16
Payer: MEDICARE

## 2021-09-16 VITALS
BODY MASS INDEX: 24.8 KG/M2 | HEART RATE: 57 BPM | HEIGHT: 67 IN | SYSTOLIC BLOOD PRESSURE: 166 MMHG | WEIGHT: 158 LBS | DIASTOLIC BLOOD PRESSURE: 90 MMHG

## 2021-09-16 DIAGNOSIS — M25.561 PAIN IN RIGHT KNEE: ICD-10-CM

## 2021-09-16 PROCEDURE — 99203 OFFICE O/P NEW LOW 30 MIN: CPT

## 2021-09-16 PROCEDURE — 73564 X-RAY EXAM KNEE 4 OR MORE: CPT | Mod: RT

## 2021-09-16 NOTE — DISCUSSION/SUMMARY
[de-identified] : 66 y/o male with right knee pain. \par \par Presentation is consistent with early degenerative change and arthrosis to the knee. Described that this is likely due to overuse, and age-related "wear and tear". Pain may be related to breakdown of the chondral surfaces, or meniscus to the medial joint. Patient did not want cortisone injection at this time for symptomatic relief.  We therefore discussed continued other options regards to conservative management of what appears to be a degenerative meniscal injury.\par \par Recommendation: Conservative care & observation; including rest/activity avoidance until less symptomatic with subsequent gradual return to full low impact activity as tolerated. Patient may also use OTC NSAIDs or acetaminophen as tolerated, with application of ice/heat to the area 2-3x daily for 20 minutes after periods of activity. \par \par Follow up as needed if pain persists for further evaluation and treatment.

## 2021-09-16 NOTE — HISTORY OF PRESENT ILLNESS
[de-identified] : 64 y/o male works with [employer] presents with right knee pain x July 2021. No specific injury reported. He states he went kayaking and he had his knee tucked in awkwardly. Right after he had sharp pain with knee flexion. He is here for evaluation. Denies radiating pain, numbness or tingling. Patient reports left knee meniscectomy in 2012. \par \par The patient's past medical history, past surgical history, medications and allergies were reviewed by me today with the patient and documented accordingly. In addition, the patient's family and social history, which were noncontributory to this visit, were reviewed also.

## 2021-09-16 NOTE — PHYSICAL EXAM
[de-identified] : Oriented to time, place, person\par Mood: Normal\par Affect: Normal\par Appearance: Healthy, well appearing, no acute distress.\par Gait: Normal\par Assistive Devices: None\par \par Right Knee Exam:\par \par Skin: Clean, dry, intact\par Inspection: No obvious malalignment, no masses, no swelling, no effusion\par Pulses: 2+ DP/PT pulses \par ROM: 0-135 degrees of flexion. +mild pain with deep knee flexion/extension.\par Tenderness: + MJLT. No LJLT. No pain over the patella facets. No pain to the quadriceps tendon. No pain to the patella tendon. No posterior knee tenderness.\par Stability: Stable to varus, valgus. Negative Lachman testing. Negative anterior drawer, negative posterior drawer.\par Strength: 5/5 Q/H/TA/GS/EHL, without atrophy\par Neuro: Intact to light touch throughout, DTRs normal\par Additional Tests: +Christa's test, Negative patellar grind test  [de-identified] : The following radiographs were ordered and read by me during this patients visit. I reviewed each radiograph in detail with the patient and discussed the findings as highlighted below. \par \par 4 views of the right knee were obtained today, 09/16/2021, that show no acute fracture or dislocation. There is no medial, trace lateral and mild patellofemoral degenerative changes seen. There is no significant malalignment. No significant other obvious osseous abnormality, otherwise unremarkable.

## 2021-10-13 ENCOUNTER — APPOINTMENT (OUTPATIENT)
Dept: PSYCHIATRY | Facility: CLINIC | Age: 66
End: 2021-10-13
Payer: MEDICARE

## 2021-10-13 PROCEDURE — 99205 OFFICE O/P NEW HI 60 MIN: CPT

## 2021-11-03 ENCOUNTER — APPOINTMENT (OUTPATIENT)
Dept: PSYCHIATRY | Facility: CLINIC | Age: 66
End: 2021-11-03
Payer: MEDICARE

## 2021-11-03 PROCEDURE — 99214 OFFICE O/P EST MOD 30 MIN: CPT | Mod: 95

## 2021-12-01 ENCOUNTER — APPOINTMENT (OUTPATIENT)
Dept: PSYCHIATRY | Facility: CLINIC | Age: 66
End: 2021-12-01
Payer: MEDICARE

## 2021-12-01 PROCEDURE — 99214 OFFICE O/P EST MOD 30 MIN: CPT | Mod: 95

## 2022-03-09 ENCOUNTER — APPOINTMENT (OUTPATIENT)
Dept: PSYCHIATRY | Facility: CLINIC | Age: 67
End: 2022-03-09
Payer: MEDICARE

## 2022-03-09 DIAGNOSIS — F51.04 PSYCHOPHYSIOLOGIC INSOMNIA: ICD-10-CM

## 2022-03-09 PROCEDURE — 99214 OFFICE O/P EST MOD 30 MIN: CPT | Mod: 95

## 2022-03-13 PROBLEM — F51.04 CHRONIC INSOMNIA: Status: ACTIVE | Noted: 2021-10-17

## 2022-03-14 NOTE — ASU PATIENT PROFILE, ADULT - FALLEN IN THE PAST
----- Message from Rehana Macdonald sent at 3/14/2022  3:11 PM CDT -----  Contact: Vignesh/ Wife  Type:  RX Refill Request    Who Called: Vignesh  Refill or New Rx: Refill  RX Name and Strength: Bactrim 800mg  How is the patient currently taking it? (ex. 1XDay): 3x weekly  Is this a 30 day or 90 day RX:   Preferred Pharmacy with phone number:   [facility]  [id number] [place] [id number]  Phone: [phone] Fax: [phone]  Local or Mail Order: Local  Ordering Provider: Dr. Hathaway  Would the patient rather a call back or a response via [facility]?  Call Back  Best Call Back Number: [phone]  Additional Information:          no

## 2022-05-19 NOTE — ASU DISCHARGE PLAN (ADULT/PEDIATRIC) - DISCHARGE PLAN IS COMPLETE AND GIVEN TO PATIENT
Cyclosporine Pregnancy And Lactation Text: This medication is Pregnancy Category C and it isn't know if it is safe during pregnancy. This medication is excreted in breast milk. : Yes

## 2022-07-01 ENCOUNTER — RX RENEWAL (OUTPATIENT)
Age: 67
End: 2022-07-01

## 2022-07-05 ENCOUNTER — RX RENEWAL (OUTPATIENT)
Age: 67
End: 2022-07-05

## 2022-07-05 RX ORDER — OMEPRAZOLE 40 MG/1
40 CAPSULE, DELAYED RELEASE ORAL
Qty: 90 | Refills: 0 | Status: ACTIVE | COMMUNITY
Start: 2019-11-12 | End: 1900-01-01

## 2022-07-15 ENCOUNTER — APPOINTMENT (OUTPATIENT)
Dept: INTERNAL MEDICINE | Facility: CLINIC | Age: 67
End: 2022-07-15

## 2022-07-15 ENCOUNTER — LABORATORY RESULT (OUTPATIENT)
Age: 67
End: 2022-07-15

## 2022-07-15 ENCOUNTER — NON-APPOINTMENT (OUTPATIENT)
Age: 67
End: 2022-07-15

## 2022-07-15 VITALS
TEMPERATURE: 97.8 F | HEIGHT: 67 IN | SYSTOLIC BLOOD PRESSURE: 112 MMHG | DIASTOLIC BLOOD PRESSURE: 70 MMHG | HEART RATE: 59 BPM | BODY MASS INDEX: 24.96 KG/M2 | WEIGHT: 159 LBS | OXYGEN SATURATION: 98 %

## 2022-07-15 DIAGNOSIS — E78.49 OTHER HYPERLIPIDEMIA: ICD-10-CM

## 2022-07-15 DIAGNOSIS — G47.33 OBSTRUCTIVE SLEEP APNEA (ADULT) (PEDIATRIC): ICD-10-CM

## 2022-07-15 DIAGNOSIS — K22.70 BARRETT'S ESOPHAGUS W/OUT DYSPLASIA: ICD-10-CM

## 2022-07-15 DIAGNOSIS — Z00.00 ENCOUNTER FOR GENERAL ADULT MEDICAL EXAMINATION W/OUT ABNORMAL FINDINGS: ICD-10-CM

## 2022-07-15 PROCEDURE — 93000 ELECTROCARDIOGRAM COMPLETE: CPT

## 2022-07-15 PROCEDURE — G0438: CPT

## 2022-07-15 PROCEDURE — 36415 COLL VENOUS BLD VENIPUNCTURE: CPT

## 2022-07-15 RX ORDER — ESCITALOPRAM OXALATE 10 MG/1
10 TABLET ORAL
Qty: 90 | Refills: 1 | Status: ACTIVE | COMMUNITY
Start: 2021-10-13 | End: 1900-01-01

## 2022-07-18 ENCOUNTER — APPOINTMENT (OUTPATIENT)
Dept: PSYCHIATRY | Facility: CLINIC | Age: 67
End: 2022-07-18

## 2022-07-18 DIAGNOSIS — F41.9 ANXIETY DISORDER, UNSPECIFIED: ICD-10-CM

## 2022-07-18 PROCEDURE — 99442: CPT | Mod: 95

## 2022-07-19 PROBLEM — G47.33 OBSTRUCTIVE SLEEP APNEA, ADULT: Status: ACTIVE | Noted: 2017-10-15

## 2022-07-19 NOTE — HISTORY OF PRESENT ILLNESS
[FreeTextEntry1] : \par 65 y/o man presents for annual exam. he feels well overall. no recent illnesses.\par \par \par he had COVID vaccines x 4 doses \par \par he had an incident while visiting family and working on his house in [place], where he was dizzy at night, likely due to dehydration , fell and hit his head. laceration repaired in ED , CT head neg. no residual concerns since then. he has been more attentive to hydration when working outside. \par he will be moving to [place] in the near future \par \par hx of Barretts esophagus s/p high freq ablation, follows w Dr Joaquin GI at [place]. had EGD, colonoscopy updated this year w Dr Joaquin.\par  continues on high dose PPI. \par \par hx of familial hyperlipidemia, on rosuvastatin ,ezetimibe w moderate control of lipids\par \par moderate SERA and using CPAP intermittently\par \par sees his dermatologist at least yearly

## 2022-07-19 NOTE — HEALTH RISK ASSESSMENT
[No] : In the past 12 months have you used drugs other than those required for medical reasons? No [0] : 2) Feeling down, depressed, or hopeless: Not at all (0) [Patient reported colonoscopy was normal] : Patient reported colonoscopy was normal [Hepatitis C test offered] : Hepatitis C test offered [None] : None [Employed] : employed [] :  [Fully functional (bathing, dressing, toileting, transferring, walking, feeding)] : Fully functional (bathing, dressing, toileting, transferring, walking, feeding) [Fully functional (using the telephone, shopping, preparing meals, housekeeping, doing laundry, using] : Fully functional and needs no help or supervision to perform IADLs (using the telephone, shopping, preparing meals, housekeeping, doing laundry, using transportation, managing medications and managing finances) [Never] : Never [ColonoscopyDate] : 2022

## 2022-07-19 NOTE — ASSESSMENT
[FreeTextEntry1] : discussed w pt \par \par reviewed updated hx \par \par recently had a fall with head injury at night with head laceration treated in [place]. likely was dehydrated, no new concerns. advised caution and continued hydration. \par \par cont current rx \par \par check routine labs as below \par \par he reports colonoscopy normal and EGD updated in 2022 w Dr Joaquin at [place] . continues on PPI \par \par cont current rx, diet control, monitor lipids \par \par reviewed vaccinations. COVID vaccines UTD, pneumococcal vaccines UTD. he will consider Shingrix vaccine \par \par cont derm f/u as scheduled for surveillance \par \par he plans to move to [place] in the near future. he is welcome to return here anytime for any needs

## 2022-07-19 NOTE — PHYSICAL EXAM
[No Acute Distress] : no acute distress [Well Nourished] : well nourished [Well Developed] : well developed [Well-Appearing] : well-appearing [Normal Voice/Communication] : normal voice/communication [Normal Sclera/Conjunctiva] : normal sclera/conjunctiva [PERRL] : pupils equal round and reactive to light [Normal Outer Ear/Nose] : the outer ears and nose were normal in appearance [Normal Oropharynx] : the oropharynx was normal [Normal TMs] : both tympanic membranes were normal [No JVD] : no jugular venous distention [Supple] : supple [No Lymphadenopathy] : no lymphadenopathy [Thyroid Normal, No Nodules] : the thyroid was normal and there were no nodules present [No Respiratory Distress] : no respiratory distress  [Clear to Auscultation] : lungs were clear to auscultation bilaterally [No Accessory Muscle Use] : no accessory muscle use [Normal Rate] : normal rate  [Regular Rhythm] : with a regular rhythm [Normal S1, S2] : normal S1 and S2 [No Murmur] : no murmur heard [No Carotid Bruits] : no carotid bruits [No Abdominal Bruit] : a ~M bruit was not heard ~T in the abdomen [No Varicosities] : no varicosities [Pedal Pulses Present] : the pedal pulses are present [No Edema] : there was no peripheral edema [No Extremity Clubbing/Cyanosis] : no extremity clubbing/cyanosis [Soft] : abdomen soft [Non Tender] : non-tender [Non-distended] : non-distended [No Masses] : no abdominal mass palpated [No HSM] : no HSM [Normal Bowel Sounds] : normal bowel sounds [Normal Sphincter Tone] : normal sphincter tone [No Mass] : no mass [Prostate Enlargement] : the prostate was not enlarged [Prostate Tenderness] : the prostate was not tender [No Prostate Nodules] : no prostate nodules [Normal Supraclavicular Nodes] : no supraclavicular lymphadenopathy [Normal Posterior Cervical Nodes] : no posterior cervical lymphadenopathy [Normal Anterior Cervical Nodes] : no anterior cervical lymphadenopathy [No Spinal Tenderness] : no spinal tenderness [No Joint Swelling] : no joint swelling [Grossly Normal Strength/Tone] : grossly normal strength/tone [No Rash] : no rash [Normal Gait] : normal gait [Coordination Grossly Intact] : coordination grossly intact [No Focal Deficits] : no focal deficits [Speech Grossly Normal] : speech grossly normal [Normal Affect] : the affect was normal [Alert and Oriented x3] : oriented to person, place, and time [Normal Mood] : the mood was normal [Normal Insight/Judgement] : insight and judgment were intact [FreeTextEntry1] : external hemorroid unchanged  [de-identified] : forehead laceration healed

## 2022-07-20 LAB
ALBUMIN SERPL ELPH-MCNC: 5 G/DL
ALP BLD-CCNC: 51 U/L
ALT SERPL-CCNC: 35 U/L
ANION GAP SERPL CALC-SCNC: 14 MMOL/L
APPEARANCE: CLEAR
AST SERPL-CCNC: 25 U/L
BASOPHILS # BLD AUTO: 0.03 K/UL
BASOPHILS NFR BLD AUTO: 0.8 %
BILIRUB SERPL-MCNC: 0.8 MG/DL
BILIRUBIN URINE: NEGATIVE
BLOOD URINE: NEGATIVE
BUN SERPL-MCNC: 21 MG/DL
CALCIUM SERPL-MCNC: 9.9 MG/DL
CHLORIDE SERPL-SCNC: 102 MMOL/L
CHOLEST SERPL-MCNC: 242 MG/DL
CO2 SERPL-SCNC: 21 MMOL/L
COLOR: YELLOW
CREAT SERPL-MCNC: 0.96 MG/DL
EGFR: 87 ML/MIN/1.73M2
EOSINOPHIL # BLD AUTO: 0.46 K/UL
EOSINOPHIL NFR BLD AUTO: 11.2 %
ESTIMATED AVERAGE GLUCOSE: 117 MG/DL
GLUCOSE QUALITATIVE U: NEGATIVE
GLUCOSE SERPL-MCNC: 91 MG/DL
HBA1C MFR BLD HPLC: 5.7 %
HCT VFR BLD CALC: 47.4 %
HDLC SERPL-MCNC: 71 MG/DL
HGB BLD-MCNC: 15.3 G/DL
KETONES URINE: NEGATIVE
LDLC SERPL CALC-MCNC: 152 MG/DL
LEUKOCYTE ESTERASE URINE: NEGATIVE
LYMPHOCYTES # BLD AUTO: 1.06 K/UL
LYMPHOCYTES NFR BLD AUTO: 25.9 %
MAN DIFF?: NORMAL
MCHC RBC-ENTMCNC: 32.3 GM/DL
MCHC RBC-ENTMCNC: 34.4 PG
MCV RBC AUTO: 106.5 FL
MONOCYTES # BLD AUTO: 0.42 K/UL
MONOCYTES NFR BLD AUTO: 10.3 %
NEUTROPHILS # BLD AUTO: 2.09 K/UL
NEUTROPHILS NFR BLD AUTO: 50.9 %
NITRITE URINE: NEGATIVE
NONHDLC SERPL-MCNC: 171 MG/DL
PH URINE: 6
PLATELET # BLD AUTO: 211 K/UL
POTASSIUM SERPL-SCNC: 5.2 MMOL/L
PROT SERPL-MCNC: 8 G/DL
PROTEIN URINE: NORMAL
PSA SERPL-MCNC: 3.6 NG/ML
RBC # BLD: 4.45 M/UL
RBC # FLD: 13.6 %
SODIUM SERPL-SCNC: 137 MMOL/L
SPECIFIC GRAVITY URINE: 1.02
T4 FREE SERPL-MCNC: 1.3 NG/DL
TRIGL SERPL-MCNC: 91 MG/DL
TSH SERPL-ACNC: 1.87 UIU/ML
UROBILINOGEN URINE: NORMAL
WBC # FLD AUTO: 4.11 K/UL

## 2022-10-03 ENCOUNTER — RX RENEWAL (OUTPATIENT)
Age: 67
End: 2022-10-03

## 2022-12-22 ENCOUNTER — RX RENEWAL (OUTPATIENT)
Age: 67
End: 2022-12-22

## 2023-01-31 ENCOUNTER — RX RENEWAL (OUTPATIENT)
Age: 68
End: 2023-01-31

## 2023-02-27 DIAGNOSIS — J06.9 ACUTE UPPER RESPIRATORY INFECTION, UNSPECIFIED: ICD-10-CM

## 2023-02-27 RX ORDER — ALBUTEROL SULFATE 90 UG/1
108 (90 BASE) INHALANT RESPIRATORY (INHALATION)
Qty: 1 | Refills: 2 | Status: ACTIVE | COMMUNITY
Start: 2023-02-27 | End: 1900-01-01

## 2023-03-14 ENCOUNTER — RX RENEWAL (OUTPATIENT)
Age: 68
End: 2023-03-14

## 2023-03-14 RX ORDER — OMEPRAZOLE 40 MG/1
40 CAPSULE, DELAYED RELEASE ORAL
Qty: 90 | Refills: 1 | Status: ACTIVE | COMMUNITY
Start: 2022-07-01 | End: 1900-01-01

## 2023-05-08 ENCOUNTER — RX RENEWAL (OUTPATIENT)
Age: 68
End: 2023-05-08

## 2023-08-14 NOTE — ED ADULT NURSE NOTE - ED STAT RN HANDOFF WHERE 2
House Xeljanz Counseling: I discussed with the patient the risks of Xeljanz therapy including increased risk of infection, liver issues, headache, diarrhea, or cold symptoms. Live vaccines should be avoided. They were instructed to call if they have any problems.

## 2023-10-05 ENCOUNTER — NON-APPOINTMENT (OUTPATIENT)
Age: 68
End: 2023-10-05

## 2023-11-24 ENCOUNTER — RX RENEWAL (OUTPATIENT)
Age: 68
End: 2023-11-24

## 2023-11-24 RX ORDER — ROSUVASTATIN CALCIUM 40 MG/1
40 TABLET, FILM COATED ORAL
Qty: 90 | Refills: 2 | Status: ACTIVE | COMMUNITY
Start: 2018-09-04 | End: 1900-01-01

## 2023-11-24 RX ORDER — EZETIMIBE 10 MG/1
10 TABLET ORAL
Qty: 90 | Refills: 2 | Status: ACTIVE | COMMUNITY
Start: 2019-01-14 | End: 1900-01-01

## 2024-07-22 NOTE — ASU PREOP CHECKLIST - PATIENT'S PERSONAL PROPERTY REMOVED
Pt now cleared for discharge by MD. GONZALEZ. Discharge and follow up instructions reviewed, pt verbalized understanding. No further questions at this time.  prescriptions sent electronically to pt pharmacy.      glasses

## 2024-08-07 NOTE — DISCHARGE NOTE ADULT - MEDICATION SUMMARY - MEDICATIONS TO STOP TAKING
minimal LLQ ttp, possible hernia felt but not sure in the left inguinal area, some point tenderness on the left hip I will STOP taking the medications listed below when I get home from the hospital:  None